# Patient Record
Sex: FEMALE | Race: BLACK OR AFRICAN AMERICAN | NOT HISPANIC OR LATINO | Employment: UNEMPLOYED | URBAN - METROPOLITAN AREA
[De-identification: names, ages, dates, MRNs, and addresses within clinical notes are randomized per-mention and may not be internally consistent; named-entity substitution may affect disease eponyms.]

---

## 2021-02-10 LAB — PAP SMEAR - HIM PATIENT REPORTED: NEGATIVE

## 2021-07-15 ENCOUNTER — HOSPITAL ENCOUNTER (EMERGENCY)
Facility: CLINIC | Age: 31
Discharge: HOME | End: 2021-07-15
Attending: EMERGENCY MEDICINE | Admitting: EMERGENCY MEDICINE
Payer: COMMERCIAL

## 2021-07-15 VITALS
HEIGHT: 67 IN | HEART RATE: 94 BPM | WEIGHT: 145 LBS | SYSTOLIC BLOOD PRESSURE: 132 MMHG | DIASTOLIC BLOOD PRESSURE: 81 MMHG | TEMPERATURE: 98.7 F | RESPIRATION RATE: 18 BRPM | BODY MASS INDEX: 22.76 KG/M2 | OXYGEN SATURATION: 98 %

## 2021-07-15 DIAGNOSIS — M54.50 ACUTE BILATERAL LOW BACK PAIN WITHOUT SCIATICA: ICD-10-CM

## 2021-07-15 DIAGNOSIS — N93.9 VAGINAL BLEEDING: ICD-10-CM

## 2021-07-15 LAB
ALBUMIN UR-MCNC: 20 MG/DL
ANION GAP SERPL CALCULATED.3IONS-SCNC: 3 MMOL/L (ref 3–14)
APPEARANCE UR: ABNORMAL
B-HCG FREE SERPL-ACNC: <5 IU/L (ref 0–5)
BASOPHILS # BLD AUTO: 0.1 10E3/UL (ref 0–0.2)
BASOPHILS NFR BLD AUTO: 1 %
BILIRUB UR QL STRIP: NEGATIVE
BUN SERPL-MCNC: 10 MG/DL (ref 7–30)
CALCIUM SERPL-MCNC: 9.2 MG/DL (ref 8.5–10.1)
CHLORIDE BLD-SCNC: 106 MMOL/L (ref 94–109)
CO2 SERPL-SCNC: 29 MMOL/L (ref 20–32)
COLOR UR AUTO: ABNORMAL
CREAT SERPL-MCNC: 0.98 MG/DL (ref 0.52–1.04)
EOSINOPHIL # BLD AUTO: 0.1 10E3/UL (ref 0–0.7)
EOSINOPHIL NFR BLD AUTO: 1 %
ERYTHROCYTE [DISTWIDTH] IN BLOOD BY AUTOMATED COUNT: 14.6 % (ref 10–15)
GFR SERPL CREATININE-BSD FRML MDRD: 78 ML/MIN/1.73M2
GLUCOSE BLD-MCNC: 91 MG/DL (ref 70–99)
GLUCOSE UR STRIP-MCNC: NEGATIVE MG/DL
HCT VFR BLD AUTO: 39.3 % (ref 35–47)
HGB BLD-MCNC: 12.2 G/DL (ref 11.7–15.7)
HGB UR QL STRIP: ABNORMAL
IMM GRANULOCYTES # BLD: 0 10E3/UL
IMM GRANULOCYTES NFR BLD: 0 %
INR PPP: 1.06 (ref 0.85–1.15)
KETONES UR STRIP-MCNC: NEGATIVE MG/DL
LEUKOCYTE ESTERASE UR QL STRIP: NEGATIVE
LYMPHOCYTES # BLD AUTO: 2 10E3/UL (ref 0.8–5.3)
LYMPHOCYTES NFR BLD AUTO: 31 %
MCH RBC QN AUTO: 28.5 PG (ref 26.5–33)
MCHC RBC AUTO-ENTMCNC: 31 G/DL (ref 31.5–36.5)
MCV RBC AUTO: 92 FL (ref 78–100)
MONOCYTES # BLD AUTO: 0.6 10E3/UL (ref 0–1.3)
MONOCYTES NFR BLD AUTO: 10 %
MUCOUS THREADS #/AREA URNS LPF: PRESENT /LPF
NEUTROPHILS # BLD AUTO: 3.6 10E3/UL (ref 1.6–8.3)
NEUTROPHILS NFR BLD AUTO: 57 %
NITRATE UR QL: NEGATIVE
NRBC # BLD AUTO: 0 10E3/UL
NRBC BLD AUTO-RTO: 0 /100
PH UR STRIP: 7 [PH] (ref 5–7)
PLATELET # BLD AUTO: 211 10E3/UL (ref 150–450)
POTASSIUM BLD-SCNC: 3.4 MMOL/L (ref 3.4–5.3)
RBC # BLD AUTO: 4.28 10E6/UL (ref 3.8–5.2)
RBC URINE: >182 /HPF
SODIUM SERPL-SCNC: 138 MMOL/L (ref 133–144)
SP GR UR STRIP: 1.01 (ref 1–1.03)
UROBILINOGEN UR STRIP-MCNC: NORMAL MG/DL
WBC # BLD AUTO: 6.3 10E3/UL (ref 4–11)
WBC URINE: 1 /HPF

## 2021-07-15 PROCEDURE — 85025 COMPLETE CBC W/AUTO DIFF WBC: CPT | Performed by: EMERGENCY MEDICINE

## 2021-07-15 PROCEDURE — 36415 COLL VENOUS BLD VENIPUNCTURE: CPT | Performed by: EMERGENCY MEDICINE

## 2021-07-15 PROCEDURE — 80048 BASIC METABOLIC PNL TOTAL CA: CPT | Performed by: EMERGENCY MEDICINE

## 2021-07-15 PROCEDURE — 84702 CHORIONIC GONADOTROPIN TEST: CPT

## 2021-07-15 PROCEDURE — 99284 EMERGENCY DEPT VISIT MOD MDM: CPT | Mod: 25

## 2021-07-15 PROCEDURE — 258N000003 HC RX IP 258 OP 636: Performed by: EMERGENCY MEDICINE

## 2021-07-15 PROCEDURE — 36592 COLLECT BLOOD FROM PICC: CPT | Performed by: EMERGENCY MEDICINE

## 2021-07-15 PROCEDURE — 96360 HYDRATION IV INFUSION INIT: CPT

## 2021-07-15 PROCEDURE — 81001 URINALYSIS AUTO W/SCOPE: CPT | Performed by: EMERGENCY MEDICINE

## 2021-07-15 PROCEDURE — 250N000013 HC RX MED GY IP 250 OP 250 PS 637: Performed by: EMERGENCY MEDICINE

## 2021-07-15 PROCEDURE — 85610 PROTHROMBIN TIME: CPT | Performed by: EMERGENCY MEDICINE

## 2021-07-15 RX ORDER — SODIUM CHLORIDE 9 MG/ML
INJECTION, SOLUTION INTRAVENOUS CONTINUOUS
Status: DISCONTINUED | OUTPATIENT
Start: 2021-07-15 | End: 2021-07-16 | Stop reason: HOSPADM

## 2021-07-15 RX ORDER — CYCLOBENZAPRINE HCL 10 MG
5-10 TABLET ORAL 3 TIMES DAILY PRN
Qty: 20 TABLET | Refills: 0 | Status: ON HOLD | OUTPATIENT
Start: 2021-07-15 | End: 2024-03-25

## 2021-07-15 RX ORDER — OXYCODONE HYDROCHLORIDE 5 MG/1
10 TABLET ORAL ONCE
Status: COMPLETED | OUTPATIENT
Start: 2021-07-15 | End: 2021-07-15

## 2021-07-15 RX ADMIN — SODIUM CHLORIDE 1000 ML: 9 INJECTION, SOLUTION INTRAVENOUS at 22:18

## 2021-07-15 RX ADMIN — OXYCODONE HYDROCHLORIDE 10 MG: 5 TABLET ORAL at 23:32

## 2021-07-15 ASSESSMENT — ENCOUNTER SYMPTOMS
BACK PAIN: 1
DIZZINESS: 0
HEADACHES: 1

## 2021-07-15 ASSESSMENT — MIFFLIN-ST. JEOR: SCORE: 1415.35

## 2021-07-16 NOTE — ED PROVIDER NOTES
"  History   Chief Complaint:  Vaginal Bleeding       The history is provided by the patient.      Arnulfo Coker is a 31 year old female who presents for evaluation of vaginal bleeding. The patient states that she was expecting her menstrual period to start two days ago as she has very regular menstrual cycles. She began to experience vaginal bleeding the day before this, which she states is odd. She notes this to be spotting for the first two days before becoming heavier than normal today. She also notes of low back pain which she states is very different compared to her normal abdominal cramping that she experiences. She states this back pain is more intense than her usually menstrual cramping. She has treated her pain with medications every few hours. She also notes of a slight intermittent headache. She denies trauma to her back, dizziness or incontinence or numbness/weakness in the legs. Patient is sexually active. She presents today concerned for a UTI.       Review of Systems   Genitourinary: Positive for vaginal bleeding.   Musculoskeletal: Positive for back pain (low).   Neurological: Positive for headaches. Negative for dizziness.   All other systems reviewed and are negative.    Allergies:  No Known Drug Allergies    Medications:    The patient is not currently taking any prescribed medications.    Past Medical History:    History reviewed. No past medical history listed or noted by the patient.     Social History:  The patient presents to the emergency department alone.      Physical Exam     Patient Vitals for the past 24 hrs:   BP Temp Temp src Pulse Resp SpO2 Height Weight   07/15/21 2152 132/81 98.7  F (37.1  C) Oral 94 18 98 % 1.702 m (5' 7\") 65.8 kg (145 lb)       Physical Exam  VS: Reviewed per above  HENT: normal speech  EYES: sclera anicteric  CV: Rate as noted, regular rhythm.   RESP: Effort normal. Breath sounds are normal bilaterally.  GI: no tenderness/rebound/guarding, not " distended.  NEURO: GCS 15, cranial nerves II through XII are intact, 5 out of 5 strength in all 4 extremities, sensation is intact light touch in all 4 extremities  MSK: No deformity of the extremities  SKIN: Warm and dry      Emergency Department Course     Laboratory:   CBC: WBC: 6.3, HB.2, PLT: 211    BMP: WNL (Creatinine: 0.98)    UA with Microscopic: Color: Light Red, Appearance: Slightly Cloudy, Blood: Large, Protein Albumin: 20, WBC: >182 (H), Mucous: Present, o/w Negative    INR: 1.06    iStat HCG quantitative pregnancy POCT: <5.0    Emergency Department Course:    Reviewed:  I reviewed nursing notes, vitals, past medical history and care everywhere    Assessments:   I obtained history and examined the patient as noted above.    I rechecked the patient and explained findings. At this point I feel that the patient is safe for discharge, and the patient agrees.      Interventions:  2218 NS 1L IV    Disposition:  The patient was discharged to home.     Impression & Plan     Medical Decision Making:  Patient presents to the ER for evaluation of irregular vaginal bleeding and low back pain.  On arrival vital signs are reassuring.  Abdomen is soft and nontender without peritoneal signs.  Hemoglobin is stable.  No evidence of pregnancy.  No back pain red flags.  No evidence of UTI.  There is hematuria but this is likely due to contamination with vaginal bleeding.  No unilateral flank pain to suggest occult renal colic.  Discussed gynecology follow-up.  Patient was not interested in starting OCP.  Return precautions discussed prior to discharge.    Diagnosis:    ICD-10-CM    1. Acute bilateral low back pain without sciatica  M54.5    2. Vaginal bleeding  N93.9        Discharge Medications:  New Prescriptions    CYCLOBENZAPRINE (FLEXERIL) 10 MG TABLET    Take 0.5-1 tablets (5-10 mg) by mouth 3 times daily as needed for other (low back pain)       Scribe Disclosure:  I, Humberto Huitron, am serving as a  scribe at 10:03 PM on 7/15/2021 to document services personally performed by Timothy Maradiaga MD based on my observations and the provider's statements to me.          Timothy Maradiaga MD  07/16/21 0017

## 2021-07-16 NOTE — DISCHARGE INSTRUCTIONS
Discharge Instructions  Vaginal Bleeding    You were seen today for unusual vaginal bleeding. Heavy or irregular bleeding may be caused by many different things such as hormone changes, infection, birth control, or cancer. At this time your provider does not find that your bleeding is a sign of anything dangerous or life-threatening, and you have not lost enough blood to be dangerous.  However, sometimes the signs of serious illness do not show up right away.  If you have new or worse symptoms, you may need to be seen again in the Emergency Department or by your primary provider.      Generally, every Emergency Department visit should have a follow-up clinic visit with either a primary or a specialty clinic/provider. Please follow-up as instructed by your emergency provider today.    Return to the Emergency Department if:  You feel lightheaded or faint.  Your bleeding becomes much heavier than it is now.  You have severe cramping or abdominal (belly) pain.  You have any new or different symptoms.    Treatment:  Motrin  or Advil  (ibuprofen) can help relieve cramps and can also decrease bleeding. You may use this according to the directions on the package. Do not use a medicine that you are allergic to, or if your provider has told you not to use it.  Hormone pills or birth control pills are occasionally prescribed to help control the bleeding but often this is left to an OB/GYN provider. These can cause problems if you have a history of blood clots or stroke, so tell your provider if you have these problems before you leave.  Iron tablets may be recommended if you have anemia (low blood count.) Iron can cause constipation, so be sure to have plenty of fiber in your diet and let your provider know if you have problems.  Drinking plenty of fluid is important. Be sure to drink extra water or other healthy drinks.  If you were given a prescription for medicine here today, be sure to read all of the information  (including the package insert) that comes with your prescription.  This will include important information about the medicine, its side effects, and any warnings that you need to know about.  The pharmacist who fills the prescription can provide more information and answer questions you may have about the medicine.  If you have questions or concerns that the pharmacist cannot address, please call or return to the Emergency Department.     Remember that you can always come back to the Emergency Department if you are not able to see your regular provider in the amount of time listed above, if you get any new symptoms, or if there is anything that worries you.    Discharge Instructions  Back Pain  You were seen today for back pain. Back pain can have many causes, but most will get better without surgery or other specific treatment. Sometimes there is a herniated ( slipped ) disc. We do not usually do MRI scans to look for these right away, since most herniated discs will get better on their own with time.  Today, we did not find any evidence that your back pain was caused by a serious condition. However, sometimes symptoms develop over time and cannot be found during an emergency visit, so it is very important that you follow up with your primary provider.  Generally, every Emergency Department visit should have a follow-up clinic visit with either a primary or a specialty clinic/provider. Please follow-up as instructed by your emergency provider today.    Return to the Emergency Department if:  You develop a fever with your back pain.   You have weakness or change in sensation in one or both legs.  You lose control of your bowels or bladder, or cannot empty your bladder (cannot pee).  Your pain gets much worse.     Follow-up with your provider:  Unless your pain has completely gone away, please make an appointment with your provider within one week. Most of the routine care for back pain is available in a clinic and  not the Emergency Department. You may need further management of your back pain, such as more pain medication, imaging such as an X-ray or MRI, or physical therapy.    What can I do to help myself?  Remain Active -- People are often afraid that they will hurt their back further or delay recovery by remaining active, but this is one of the best things you can do for your back. In fact, staying in bed for a long time to rest is not recommended. Studies have shown that people with low back pain recover faster when they remain active. Movement helps to bring blood flow to the muscles and relieve muscle spasms as well as preventing loss of muscle strength.  Heat -- Using a heating pad can help with low back pain during the first few weeks. Do not sleep with a heating pad, as you can be burned.   Pain medications - You may take a pain medication such as Tylenol  (acetaminophen), Advil , Motrin  (ibuprofen) or Aleve  (naproxen).  If you were given a prescription for medicine here today, be sure to read all of the information (including the package insert) that comes with your prescription.  This will include important information about the medicine, its side effects, and any warnings that you need to know about.  The pharmacist who fills the prescription can provide more information and answer questions you may have about the medicine.  If you have questions or concerns that the pharmacist cannot address, please call or return to the Emergency Department.   Remember that you can always come back to the Emergency Department if you are not able to see your regular provider in the amount of time listed above, if you get any new symptoms, or if there is anything that worries you.

## 2022-04-06 ENCOUNTER — PRENATAL OFFICE VISIT (OUTPATIENT)
Dept: NURSING | Facility: CLINIC | Age: 32
End: 2022-04-06
Payer: COMMERCIAL

## 2022-04-06 DIAGNOSIS — Z91.199 NO-SHOW FOR APPOINTMENT: Primary | ICD-10-CM

## 2022-05-03 ENCOUNTER — ANESTHESIA (OUTPATIENT)
Dept: SURGERY | Facility: CLINIC | Age: 32
End: 2022-05-03
Payer: COMMERCIAL

## 2022-05-03 ENCOUNTER — ANESTHESIA EVENT (OUTPATIENT)
Dept: SURGERY | Facility: CLINIC | Age: 32
End: 2022-05-03
Payer: COMMERCIAL

## 2022-05-03 ENCOUNTER — APPOINTMENT (OUTPATIENT)
Dept: ULTRASOUND IMAGING | Facility: CLINIC | Age: 32
End: 2022-05-03
Attending: EMERGENCY MEDICINE
Payer: COMMERCIAL

## 2022-05-03 ENCOUNTER — APPOINTMENT (OUTPATIENT)
Dept: ULTRASOUND IMAGING | Facility: CLINIC | Age: 32
End: 2022-05-03
Attending: OBSTETRICS & GYNECOLOGY
Payer: COMMERCIAL

## 2022-05-03 ENCOUNTER — HOSPITAL ENCOUNTER (OUTPATIENT)
Facility: CLINIC | Age: 32
Discharge: HOME | End: 2022-05-03
Attending: EMERGENCY MEDICINE | Admitting: OBSTETRICS & GYNECOLOGY
Payer: COMMERCIAL

## 2022-05-03 VITALS
DIASTOLIC BLOOD PRESSURE: 55 MMHG | HEART RATE: 85 BPM | OXYGEN SATURATION: 100 % | RESPIRATION RATE: 12 BRPM | TEMPERATURE: 98.6 F | SYSTOLIC BLOOD PRESSURE: 96 MMHG

## 2022-05-03 DIAGNOSIS — O03.4 RETAINED PRODUCTS OF CONCEPTION AFTER MISCARRIAGE: ICD-10-CM

## 2022-05-03 DIAGNOSIS — O03.4 INCOMPLETE ABORTION: Primary | ICD-10-CM

## 2022-05-03 DIAGNOSIS — D64.9 ANEMIA, UNSPECIFIED TYPE: ICD-10-CM

## 2022-05-03 LAB
ABO/RH(D): NORMAL
B-HCG SERPL-ACNC: 2011 IU/L (ref 0–5)
BASOPHILS # BLD AUTO: 0.1 10E3/UL (ref 0–0.2)
BASOPHILS NFR BLD AUTO: 1 %
EOSINOPHIL # BLD AUTO: 0.1 10E3/UL (ref 0–0.7)
EOSINOPHIL NFR BLD AUTO: 2 %
ERYTHROCYTE [DISTWIDTH] IN BLOOD BY AUTOMATED COUNT: 15 % (ref 10–15)
HCT VFR BLD AUTO: 29 % (ref 35–47)
HCT VFR BLD AUTO: 32 % (ref 35–47)
HGB BLD-MCNC: 8.7 G/DL (ref 11.7–15.7)
HGB BLD-MCNC: 9.8 G/DL (ref 11.7–15.7)
IMM GRANULOCYTES # BLD: 0 10E3/UL
IMM GRANULOCYTES NFR BLD: 0 %
LYMPHOCYTES # BLD AUTO: 1.6 10E3/UL (ref 0.8–5.3)
LYMPHOCYTES NFR BLD AUTO: 28 %
MCH RBC QN AUTO: 27.3 PG (ref 26.5–33)
MCHC RBC AUTO-ENTMCNC: 30.6 G/DL (ref 31.5–36.5)
MCV RBC AUTO: 89 FL (ref 78–100)
MONOCYTES # BLD AUTO: 0.4 10E3/UL (ref 0–1.3)
MONOCYTES NFR BLD AUTO: 6 %
NEUTROPHILS # BLD AUTO: 3.6 10E3/UL (ref 1.6–8.3)
NEUTROPHILS NFR BLD AUTO: 63 %
NRBC # BLD AUTO: 0 10E3/UL
NRBC BLD AUTO-RTO: 0 /100
PLAT MORPH BLD: ABNORMAL
PLATELET # BLD AUTO: ABNORMAL 10*3/UL
RBC # BLD AUTO: 3.59 10E6/UL (ref 3.8–5.2)
RBC MORPH BLD: ABNORMAL
SARS-COV-2 RNA RESP QL NAA+PROBE: NEGATIVE
SPECIMEN EXPIRATION DATE: NORMAL
WBC # BLD AUTO: 5.7 10E3/UL (ref 4–11)

## 2022-05-03 PROCEDURE — 85048 AUTOMATED LEUKOCYTE COUNT: CPT | Performed by: EMERGENCY MEDICINE

## 2022-05-03 PROCEDURE — P9041 ALBUMIN (HUMAN),5%, 50ML: HCPCS | Performed by: NURSE ANESTHETIST, CERTIFIED REGISTERED

## 2022-05-03 PROCEDURE — U0005 INFEC AGEN DETEC AMPLI PROBE: HCPCS | Performed by: EMERGENCY MEDICINE

## 2022-05-03 PROCEDURE — 999N000063 US INTRAOPERATIVE: Mod: TC

## 2022-05-03 PROCEDURE — 85014 HEMATOCRIT: CPT | Performed by: EMERGENCY MEDICINE

## 2022-05-03 PROCEDURE — 250N000011 HC RX IP 250 OP 636: Performed by: ANESTHESIOLOGY

## 2022-05-03 PROCEDURE — 710N000012 HC RECOVERY PHASE 2, PER MINUTE: Performed by: OBSTETRICS & GYNECOLOGY

## 2022-05-03 PROCEDURE — 999N000141 HC STATISTIC PRE-PROCEDURE NURSING ASSESSMENT: Performed by: OBSTETRICS & GYNECOLOGY

## 2022-05-03 PROCEDURE — 99285 EMERGENCY DEPT VISIT HI MDM: CPT | Mod: 25

## 2022-05-03 PROCEDURE — 250N000009 HC RX 250: Performed by: NURSE ANESTHETIST, CERTIFIED REGISTERED

## 2022-05-03 PROCEDURE — 76801 OB US < 14 WKS SINGLE FETUS: CPT

## 2022-05-03 PROCEDURE — 710N000009 HC RECOVERY PHASE 1, LEVEL 1, PER MIN: Performed by: OBSTETRICS & GYNECOLOGY

## 2022-05-03 PROCEDURE — 360N000075 HC SURGERY LEVEL 2, PER MIN: Performed by: OBSTETRICS & GYNECOLOGY

## 2022-05-03 PROCEDURE — 258N000003 HC RX IP 258 OP 636: Performed by: EMERGENCY MEDICINE

## 2022-05-03 PROCEDURE — 84702 CHORIONIC GONADOTROPIN TEST: CPT | Performed by: EMERGENCY MEDICINE

## 2022-05-03 PROCEDURE — 272N000001 HC OR GENERAL SUPPLY STERILE: Performed by: OBSTETRICS & GYNECOLOGY

## 2022-05-03 PROCEDURE — 96361 HYDRATE IV INFUSION ADD-ON: CPT

## 2022-05-03 PROCEDURE — 96374 THER/PROPH/DIAG INJ IV PUSH: CPT

## 2022-05-03 PROCEDURE — 88233 TISSUE CULTURE SKIN/BIOPSY: CPT | Performed by: OBSTETRICS & GYNECOLOGY

## 2022-05-03 PROCEDURE — 370N000017 HC ANESTHESIA TECHNICAL FEE, PER MIN: Performed by: OBSTETRICS & GYNECOLOGY

## 2022-05-03 PROCEDURE — 88305 TISSUE EXAM BY PATHOLOGIST: CPT | Mod: TC | Performed by: OBSTETRICS & GYNECOLOGY

## 2022-05-03 PROCEDURE — C9803 HOPD COVID-19 SPEC COLLECT: HCPCS

## 2022-05-03 PROCEDURE — 86901 BLOOD TYPING SEROLOGIC RH(D): CPT | Performed by: EMERGENCY MEDICINE

## 2022-05-03 PROCEDURE — 250N000011 HC RX IP 250 OP 636: Performed by: NURSE ANESTHETIST, CERTIFIED REGISTERED

## 2022-05-03 PROCEDURE — 36415 COLL VENOUS BLD VENIPUNCTURE: CPT | Performed by: EMERGENCY MEDICINE

## 2022-05-03 PROCEDURE — 250N000011 HC RX IP 250 OP 636: Performed by: EMERGENCY MEDICINE

## 2022-05-03 PROCEDURE — 258N000003 HC RX IP 258 OP 636: Performed by: NURSE ANESTHETIST, CERTIFIED REGISTERED

## 2022-05-03 PROCEDURE — 88291 CYTO/MOLECULAR REPORT: CPT | Performed by: MEDICAL GENETICS

## 2022-05-03 RX ORDER — PHENYLEPHRINE HYDROCHLORIDE 10 MG/ML
INJECTION INTRAVENOUS PRN
Status: DISCONTINUED | OUTPATIENT
Start: 2022-05-03 | End: 2022-05-03

## 2022-05-03 RX ORDER — SODIUM CHLORIDE, SODIUM LACTATE, POTASSIUM CHLORIDE, CALCIUM CHLORIDE 600; 310; 30; 20 MG/100ML; MG/100ML; MG/100ML; MG/100ML
INJECTION, SOLUTION INTRAVENOUS CONTINUOUS PRN
Status: DISCONTINUED | OUTPATIENT
Start: 2022-05-03 | End: 2022-05-03

## 2022-05-03 RX ORDER — ONDANSETRON 4 MG/1
4 TABLET, ORALLY DISINTEGRATING ORAL EVERY 30 MIN PRN
Status: DISCONTINUED | OUTPATIENT
Start: 2022-05-03 | End: 2022-05-03 | Stop reason: HOSPADM

## 2022-05-03 RX ORDER — FENTANYL CITRATE 50 UG/ML
50 INJECTION, SOLUTION INTRAMUSCULAR; INTRAVENOUS ONCE
Status: COMPLETED | OUTPATIENT
Start: 2022-05-03 | End: 2022-05-03

## 2022-05-03 RX ORDER — ONDANSETRON 2 MG/ML
INJECTION INTRAMUSCULAR; INTRAVENOUS PRN
Status: DISCONTINUED | OUTPATIENT
Start: 2022-05-03 | End: 2022-05-03

## 2022-05-03 RX ORDER — DOXYCYCLINE 100 MG/1
100 CAPSULE ORAL ONCE
Status: CANCELLED | OUTPATIENT
Start: 2022-05-03 | End: 2022-05-03

## 2022-05-03 RX ORDER — OXYCODONE HYDROCHLORIDE 5 MG/1
5 TABLET ORAL EVERY 4 HOURS PRN
Status: DISCONTINUED | OUTPATIENT
Start: 2022-05-03 | End: 2022-05-03 | Stop reason: HOSPADM

## 2022-05-03 RX ORDER — LIDOCAINE HYDROCHLORIDE 10 MG/ML
INJECTION, SOLUTION INFILTRATION; PERINEURAL PRN
Status: DISCONTINUED | OUTPATIENT
Start: 2022-05-03 | End: 2022-05-03

## 2022-05-03 RX ORDER — SODIUM CHLORIDE 9 MG/ML
INJECTION, SOLUTION INTRAVENOUS CONTINUOUS
Status: DISCONTINUED | OUTPATIENT
Start: 2022-05-03 | End: 2022-05-03 | Stop reason: HOSPADM

## 2022-05-03 RX ORDER — LABETALOL HYDROCHLORIDE 5 MG/ML
10 INJECTION, SOLUTION INTRAVENOUS
Status: DISCONTINUED | OUTPATIENT
Start: 2022-05-03 | End: 2022-05-03 | Stop reason: HOSPADM

## 2022-05-03 RX ORDER — FENTANYL CITRATE 50 UG/ML
INJECTION, SOLUTION INTRAMUSCULAR; INTRAVENOUS PRN
Status: DISCONTINUED | OUTPATIENT
Start: 2022-05-03 | End: 2022-05-03

## 2022-05-03 RX ORDER — IBUPROFEN 800 MG/1
800 TABLET, FILM COATED ORAL EVERY 6 HOURS PRN
Qty: 30 TABLET | Refills: 0 | Status: ON HOLD | OUTPATIENT
Start: 2022-05-03 | End: 2024-03-25

## 2022-05-03 RX ORDER — ONDANSETRON 2 MG/ML
4 INJECTION INTRAMUSCULAR; INTRAVENOUS EVERY 30 MIN PRN
Status: DISCONTINUED | OUTPATIENT
Start: 2022-05-03 | End: 2022-05-03 | Stop reason: HOSPADM

## 2022-05-03 RX ORDER — ACETAMINOPHEN 325 MG/1
975 TABLET ORAL ONCE
Status: CANCELLED | OUTPATIENT
Start: 2022-05-03 | End: 2022-05-03

## 2022-05-03 RX ORDER — SODIUM CHLORIDE, SODIUM LACTATE, POTASSIUM CHLORIDE, CALCIUM CHLORIDE 600; 310; 30; 20 MG/100ML; MG/100ML; MG/100ML; MG/100ML
INJECTION, SOLUTION INTRAVENOUS CONTINUOUS
Status: DISCONTINUED | OUTPATIENT
Start: 2022-05-03 | End: 2022-05-03 | Stop reason: HOSPADM

## 2022-05-03 RX ORDER — IBUPROFEN 800 MG/1
800 TABLET, FILM COATED ORAL ONCE
Status: DISCONTINUED | OUTPATIENT
Start: 2022-05-03 | End: 2022-05-03 | Stop reason: HOSPADM

## 2022-05-03 RX ORDER — DEXAMETHASONE SODIUM PHOSPHATE 4 MG/ML
INJECTION, SOLUTION INTRA-ARTICULAR; INTRALESIONAL; INTRAMUSCULAR; INTRAVENOUS; SOFT TISSUE PRN
Status: DISCONTINUED | OUTPATIENT
Start: 2022-05-03 | End: 2022-05-03

## 2022-05-03 RX ORDER — HYDROMORPHONE HCL IN WATER/PF 6 MG/30 ML
0.2 PATIENT CONTROLLED ANALGESIA SYRINGE INTRAVENOUS EVERY 5 MIN PRN
Status: DISCONTINUED | OUTPATIENT
Start: 2022-05-03 | End: 2022-05-03 | Stop reason: HOSPADM

## 2022-05-03 RX ORDER — ALBUMIN, HUMAN INJ 5% 5 %
SOLUTION INTRAVENOUS CONTINUOUS PRN
Status: DISCONTINUED | OUTPATIENT
Start: 2022-05-03 | End: 2022-05-03

## 2022-05-03 RX ORDER — FENTANYL CITRATE 50 UG/ML
25 INJECTION, SOLUTION INTRAMUSCULAR; INTRAVENOUS EVERY 5 MIN PRN
Status: DISCONTINUED | OUTPATIENT
Start: 2022-05-03 | End: 2022-05-03 | Stop reason: HOSPADM

## 2022-05-03 RX ORDER — PROPOFOL 10 MG/ML
INJECTION, EMULSION INTRAVENOUS PRN
Status: DISCONTINUED | OUTPATIENT
Start: 2022-05-03 | End: 2022-05-03

## 2022-05-03 RX ORDER — ACETAMINOPHEN 325 MG/1
975 TABLET ORAL ONCE
Status: DISCONTINUED | OUTPATIENT
Start: 2022-05-03 | End: 2022-05-03 | Stop reason: HOSPADM

## 2022-05-03 RX ADMIN — ALBUMIN HUMAN: 0.05 INJECTION, SOLUTION INTRAVENOUS at 16:51

## 2022-05-03 RX ADMIN — LIDOCAINE HYDROCHLORIDE 50 MG: 10 INJECTION, SOLUTION INFILTRATION; PERINEURAL at 16:39

## 2022-05-03 RX ADMIN — PHENYLEPHRINE HYDROCHLORIDE 100 MCG: 10 INJECTION INTRAVENOUS at 16:49

## 2022-05-03 RX ADMIN — FENTANYL CITRATE 25 MCG: 50 INJECTION, SOLUTION INTRAMUSCULAR; INTRAVENOUS at 16:58

## 2022-05-03 RX ADMIN — ONDANSETRON HYDROCHLORIDE 4 MG: 2 INJECTION, SOLUTION INTRAVENOUS at 16:50

## 2022-05-03 RX ADMIN — SODIUM CHLORIDE 1000 ML: 9 INJECTION, SOLUTION INTRAVENOUS at 12:57

## 2022-05-03 RX ADMIN — FENTANYL CITRATE 25 MCG: 50 INJECTION, SOLUTION INTRAMUSCULAR; INTRAVENOUS at 17:39

## 2022-05-03 RX ADMIN — SODIUM CHLORIDE 1000 ML: 9 INJECTION, SOLUTION INTRAVENOUS at 15:00

## 2022-05-03 RX ADMIN — Medication 60 MG: at 16:39

## 2022-05-03 RX ADMIN — PROPOFOL 120 MG: 10 INJECTION, EMULSION INTRAVENOUS at 16:39

## 2022-05-03 RX ADMIN — FENTANYL CITRATE 50 MCG: 50 INJECTION INTRAMUSCULAR; INTRAVENOUS at 16:22

## 2022-05-03 RX ADMIN — SODIUM CHLORIDE, POTASSIUM CHLORIDE, SODIUM LACTATE AND CALCIUM CHLORIDE: 600; 310; 30; 20 INJECTION, SOLUTION INTRAVENOUS at 16:36

## 2022-05-03 RX ADMIN — DEXAMETHASONE SODIUM PHOSPHATE 8 MG: 4 INJECTION, SOLUTION INTRA-ARTICULAR; INTRALESIONAL; INTRAMUSCULAR; INTRAVENOUS; SOFT TISSUE at 16:39

## 2022-05-03 ASSESSMENT — ACTIVITIES OF DAILY LIVING (ADL)
ADLS_ACUITY_SCORE: 12
ADLS_ACUITY_SCORE: 12

## 2022-05-03 NOTE — ED NOTES
Writer assisted pt in changing chux pad saturated with blood. Writer reiterated plan of care to pt. Pt verbalized understanding.

## 2022-05-03 NOTE — ED PROVIDER NOTES
History   Chief Complaint:  Miscarriage     The history is provided by the patient.      Arnulfo Coker is a 32 year old female who presents with vaginal bleeding. Patient presents from [facility] for vaginal bleeding and miscarriage. She is approximately 3 months pregnant and this is her first pregnancy. Patient developed light bleeding on 4/29. This morning she developed heavy bleeding. Denies injury or trauma. She has had two ultrasounds this pregnancy. One a few months ago at [facility] showed no gestational sac. She had another done yesterday with results below. She was taking Tylenol and ibuprofen for pain. She started an antibiotic yesterday for a UTI. History of uterine fibroid and was going to have surgery but it was cancelled due to pregnancy. She does not know her blood type. No history of cardiac or bleeding problems. Her last menstrual period was 2/9/22.     Ultrasound from [facility] 5/2/22  IMPRESSION:   1. No definite intrauterine pregnancy identified.  2. 2 areas of fluid within the endometrial space as described above, nonspecific.      Review of Systems   Genitourinary: Positive for pelvic pain and vaginal bleeding.   All other systems reviewed and are negative.    Allergies:  The patient has no known allergies.     Medications:  Amoxicillin   Ferrous sulfate   Cholecalciferol   Remeron   Protonix     Past Medical History:     H. Pylori   Anxiety  GERD  Vitamin d deficiency   Depression, single episode       Past Surgical History:    EGD     Family History:    Father: hypertension  Mother: DANTE disease     Social History:  Presents to ED alone     Physical Exam     Patient Vitals for the past 24 hrs:   BP Temp Temp src Pulse Resp SpO2   05/03/22 1600 100/66 -- -- 96 -- 100 %   05/03/22 1545 96/56 -- -- 80 -- 99 %   05/03/22 1500 103/56 -- -- 99 -- 99 %   05/03/22 1457 -- -- -- -- -- 99 %   05/03/22 1455 103/77 -- -- 88 -- 92 %   05/03/22 1448 (!) 81/46 -- -- 94 -- 100 %   05/03/22 1445  (!) 62/42 -- -- 106 -- 99 %   05/03/22 1420 111/86 -- -- -- -- 100 %   05/03/22 1400 109/75 -- -- 80 -- 100 %   05/03/22 1350 114/68 -- -- -- -- --   05/03/22 1330 110/74 -- -- 86 -- 100 %   05/03/22 1320 95/68 -- -- 72 -- 100 %   05/03/22 1310 92/72 -- -- -- -- 100 %   05/03/22 1300 96/65 -- -- (!) 123 -- --   05/03/22 1250 117/83 -- -- -- -- --   05/03/22 1240 112/77 -- -- 107 -- --   05/03/22 1223 95/68 98.1  F (36.7  C) Temporal (!) 121 20 100 %   05/03/22 1222 95/68 97.6  F (36.4  C) Temporal 111 20 100 %       Physical Exam  Constitutional:       General: She is not in acute distress.     Appearance: She is not diaphoretic.   HENT:      Head: Atraumatic.      Mouth/Throat:      Mouth: Mucous membranes are moist.      Pharynx: No oropharyngeal exudate.   Eyes:      General: No scleral icterus.     Pupils: Pupils are equal, round, and reactive to light.   Cardiovascular:      Rate and Rhythm: Normal rate and regular rhythm.      Heart sounds: Normal heart sounds.   Pulmonary:      Effort: No respiratory distress.      Breath sounds: Normal breath sounds.   Abdominal:      General: Bowel sounds are normal.      Palpations: Abdomen is soft.      Tenderness: There is abdominal tenderness in the suprapubic area.   Genitourinary:     Comments: No external vaginal lesions.  There is a narrow introitus.  There is a large amount of clot in the vaginal vault which was removed.  The cervix is visualized and is open.  There is ongoing oozing from the cervical os.  No genital tract lacerations.  No cervical motion tenderness.  No erythema or discharge from the os.  Musculoskeletal:         General: No tenderness.   Skin:     General: Skin is warm.      Capillary Refill: Capillary refill takes less than 2 seconds.      Findings: No rash.   Neurological:      General: No focal deficit present.      Mental Status: She is oriented to person, place, and time.   Psychiatric:         Mood and Affect: Mood normal.         Behavior:  Behavior normal.       Emergency Department Course     Imaging:  US OB < 14 Weeks Single   Final Result   IMPRESSION: No convincing evidence of intrauterine products of   conception. Differential includes spontaneous  and normal   pregnancy with incorrect dates. Ectopic pregnancy is not excluded.   Followup as clinically indicated. No evidence for hemorrhage.      THOMAS DIALLO MD            SYSTEM ID:  [id number]      Report per radiology    Laboratory:  Labs Ordered and Resulted from Time of ED Arrival to Time of ED Departure   CBC WITH PLATELETS AND DIFFERENTIAL - Abnormal       Result Value    WBC Count 5.7      RBC Count 3.59 (*)     Hemoglobin 9.8 (*)     Hematocrit 32.0 (*)     MCV 89      MCH 27.3      MCHC 30.6 (*)     RDW 15.0      Platelet Count        % Neutrophils 63      % Lymphocytes 28      % Monocytes 6      % Eosinophils 2      % Basophils 1      % Immature Granulocytes 0      NRBCs per 100 WBC 0      Absolute Neutrophils 3.6      Absolute Lymphocytes 1.6      Absolute Monocytes 0.4      Absolute Eosinophils 0.1      Absolute Basophils 0.1      Absolute Immature Granulocytes 0.0      Absolute NRBCs 0.0     RBC AND PLATELET MORPHOLOGY - Abnormal    Platelet Assessment Platelets Clumped (*)     RBC Morphology Confirmed RBC Indices     HEMOGLOBIN AND HEMATOCRIT - Abnormal    Hemoglobin 8.7 (*)     Hematocrit 29.0 (*)    COVID-19 VIRUS (CORONAVIRUS) BY PCR - Normal    SARS CoV2 PCR Negative     HCG QUANTITATIVE PREGNANCY   ABO AND RH    ABO/RH(D) O POS      SPECIMEN EXPIRATION DATE [id number]     WET PREPARATION   CHLAMYDIA TRACHOMATIS PCR   NEISSERIA GONORRHOEAE PCR      Emergency Department Course:     Reviewed:  I reviewed nursing notes, vitals, past medical history and Care Everywhere    Assessments:  1238 I obtained history and examined the patient as noted above.   1424 Performed pelvic exam with female chaperone present.     Consults: Dr Castle     Interventions:  1257 NS 1L IV      Disposition:  The patient was admitted to the hospital under the care of Dr. Castle.     Impression & Plan       Medical Decision Making:  This patient is a 32-year-old woman who presents to the emergency department with a spontaneous .  She is approximately 11 weeks pregnant.  Ultrasound yesterday did not demonstrate a viable pregnancy.  She began to have increased bleeding overnight with passage of clots.  She was lightheaded on arrival with a systolic blood pressure in the 90s.  This improved with IV fluids.  She had mild suprapubic tenderness.  Ultrasound read by radiology without evidence for significant retained products of conception.  However, on pelvic exam the patient had a large amount of clot in vaginal vault which was removed.  She has ongoing bleeding from the cervical os.  When getting back up into the cart after the exam she became lightheaded and her blood pressure dropped again.  This again improved with IV fluids.  Dr. Castle has come evaluated the patient in the ED and she will be taken for D&C.  Blood type is O+.    Critical Care time was 35 minutes for this patient excluding procedures.    Diagnosis:    ICD-10-CM    1. Incomplete   O03.4    2. Anemia, unspecified type  D64.9        Discharge Medications:  New Prescriptions    No medications on file       Scribe Disclosure:  SAMANTHA, Abdulaziz Herrera, am serving as a scribe at 12:37 PM on 5/3/2022 to document services personally performed by Santos Tay MD based on my observations and the provider's statements to me.            Sanots Tay MD  [phone]

## 2022-05-03 NOTE — ANESTHESIA PREPROCEDURE EVALUATION
Anesthesia Pre-Procedure Evaluation    Patient: Arnulfo Coker   MRN: [id number] : 1990        Procedure : Procedure(s):  DILATION AND CURETTAGE, UTERUS, USING SUCTION, WITH ULTRASOUND GUIDANCE          No past medical history on file.   No past surgical history on file.   No Known Allergies   Social History     Tobacco Use     Smoking status: Not on file     Smokeless tobacco: Not on file   Substance Use Topics     Alcohol use: Not on file      Wt Readings from Last 1 Encounters:   07/15/21 65.8 kg (145 lb)        Anesthesia Evaluation            ROS/MED HX  ENT/Pulmonary:       Neurologic:       Cardiovascular:       METS/Exercise Tolerance:     Hematologic:       Musculoskeletal:       GI/Hepatic:       Renal/Genitourinary:       Endo:       Psychiatric/Substance Use:       Infectious Disease:       Malignancy:       Other:   12 weeks gestation, partial miscarriage with active vaginal bleeding            OUTSIDE LABS:  CBC:   Lab Results   Component Value Date    WBC 5.7 2022    WBC 6.3 07/15/2021    HGB 8.7 (L) 2022    HGB 9.8 (L) 2022    HCT 29.0 (L) 2022    HCT 32.0 (L) 2022    PLT  2022      Comment:      Platelets Clumped-Platelet Count Not Available     07/15/2021     BMP:   Lab Results   Component Value Date     07/15/2021     2011    POTASSIUM 3.4 07/15/2021    POTASSIUM 4.0 2011    CHLORIDE 106 07/15/2021    CHLORIDE 105 2011    CO2 29 07/15/2021    CO2 25 2011    BUN 10 07/15/2021    BUN 10 2011    CR 0.98 07/15/2021    CR 0.70 2011    GLC 91 07/15/2021     (H) 2011     COAGS:   Lab Results   Component Value Date    INR 1.06 07/15/2021     POC:   Lab Results   Component Value Date    HCG Negative 2011     HEPATIC: No results found for: ALBUMIN, PROTTOTAL, ALT, AST, GGT, ALKPHOS, BILITOTAL, BILIDIRECT, MICHAEL  OTHER:   Lab Results   Component Value Date    ASIYA 9.2 07/15/2021        Anesthesia Plan    ASA Status:  2, emergent       Anesthesia Type: General.     - Airway: ETT   Induction: Intravenous, RSI.           Consents    Anesthesia Plan(s) and associated risks, benefits, and realistic alternatives discussed. Questions answered and patient/representative(s) expressed understanding.    - Discussed:     - Discussed with:  Patient         Postoperative Care    Pain management: IV analgesics, Oral pain medications, Multi-modal analgesia.   PONV prophylaxis: Ondansetron (or other 5HT-3), Dexamethasone or Solumedrol     Comments:                Riri Kirk MD

## 2022-05-03 NOTE — LETTER
[facility] POST ANESTHESIA CARE  [address] [facility] [address]  [phone]         Medication Pick-Up Form      May 3, 2022    Patient's Name:  Arnulfo Coker    YOB: 1990      I have prescribed the following medication for this patient and request that it be picked up by her family member while she is in postoperative recovery.     Motrin 800mg  1 tablet by mouth every 6 hours, as needed for pain  Dispense: #30    Provider:    Skye Suárez MD on 5/3/2022 at 5:39 PM

## 2022-05-03 NOTE — OP NOTE
Surgery Date:  5/3/2022    Primary Surgeon:    Skye Suárez MD    Assistants:   none    Post-op Diagnosis:    32 year old   with anembryonic gestation at 12wk by LMP, hemorrhage in ED    Procedure:    Cervical dilation and suction curettage.    EBL:  500 mL in ED, 20mL intraoperatively    Anesthesia: GET    UOP: 1000mL    Specimens:  Products of conception    Complications / Findings:  1.  No apparent complications.  2.  anteverted uterus.  3.  No signs of perforation and ultrasound guidance during the procedure confirmed no retained products    Indications: Arnulfo Coker is a 32 year old yo G1 who was unfortunately diagnosed with a missed .  We discussed management options and she chose to proceed with D&C.  Risks of infection, bleeding, uterine perforation were discussed and informed consent was obtained.  She is Rh positive. She presented to the ED in distress, with significant bleeding, and decreasing blood pressures.     Procedure:  She was taken to the OR with IV running and GET anesthesia was administered.  She was prepped and draped and a 'time out' was held to confirm the procedure.  Exam under anesthesia was performed with the above findings.  A speculum was inserted and the anterior lip of the cervix was then grasped with a single tooth tenaculum.  The cervix was already dilated.  An 11mm rigid curette was used after testing for appropriate suction to empty the uterus of products of conception which were grossly seen through the tubing.  Ultrasound was present, and confirmed complete uterine evacuation. A 4.4 cm intramural fibroid was noted to the patient's left, not displacing the endometrium.  All instruments were removed from her vagina and the case was complete.  No apparent complications and she was transferred to the PACU in stable condition.    Skye Suárez MD on 5/3/2022 at 5:26 PM

## 2022-05-03 NOTE — DISCHARGE INSTRUCTIONS
Maximum Acetaminophen/Tylenol in 24 hours= 4,000 mg.  Maximum Ibuprofen/Motrin in 24 hours  = 3,000 mg.    GENERAL ANESTHESIA OR SEDATION ADULT DISCHARGE INSTRUCTIONS   SPECIAL PRECAUTIONS FOR 24 HOURS AFTER SURGERY    IT IS NOT UNUSUAL TO FEEL LIGHT-HEADED OR FAINT, UP TO 24 HOURS AFTER SURGERY OR WHILE TAKING PAIN MEDICATION.  IF YOU HAVE THESE SYMPTOMS; SIT FOR A FEW MINUTES BEFORE STANDING AND HAVE SOMEONE ASSIST YOU WHEN YOU GET UP TO WALK OR USE THE BATHROOM.    YOU SHOULD REST AND RELAX FOR THE NEXT 24 HOURS AND YOU MUST MAKE ARRANGEMENTS TO HAVE SOMEONE STAY WITH YOU FOR AT LEAST 24 HOURS AFTER YOUR DISCHARGE.  AVOID HAZARDOUS AND STRENUOUS ACTIVITIES.  DO NOT MAKE IMPORTANT DECISIONS FOR 24 HOURS.    DO NOT DRIVE ANY VEHICLE OR OPERATE MECHANICAL EQUIPMENT FOR 24 HOURS FOLLOWING THE END OF YOUR SURGERY.  EVEN THOUGH YOU MAY FEEL NORMAL, YOUR REACTIONS MAY BE AFFECTED BY THE MEDICATION YOU HAVE RECEIVED.    DO NOT DRINK ALCOHOLIC BEVERAGES FOR 24 HOURS FOLLOWING YOUR SURGERY.    DRINK CLEAR LIQUIDS (APPLE JUICE, GINGER ALE, 7-UP, BROTH, ETC.).  PROGRESS TO YOUR REGULAR DIET AS YOU FEEL ABLE.    YOU MAY HAVE A DRY MOUTH, A SORE THROAT, MUSCLES ACHES OR TROUBLE SLEEPING.  THESE SHOULD GO AWAY AFTER 24 HOURS.    CALL YOUR DOCTOR FOR ANY OF THE FOLLOWING:  SIGNS OF INFECTION (FEVER, GROWING TENDERNESS AT THE SURGERY SITE, A LARGE AMOUNT OF DRAINAGE OR BLEEDING, SEVERE PAIN, FOUL-SMELLING DRAINAGE, REDNESS OR SWELLING.    IT HAS BEEN OVER 8 TO 10 HOURS SINCE SURGERY AND YOU ARE STILL NOT ABLE TO URINATE (PASS WATER).      DILATION AND CURETTAGE DISCHARGE INSTRUCTIONS  (FOR PATIENTS WHOSE PREGNANCY HAS ENDED)    FOR THE FIRST 1 TO 2 DAYS    -You may feel drowsy and weak.  -Don't drive a car, drink alcohol or use machinery for 24 hours.  -Get plenty of rest.  -You may bathe or shower and climb stairs.  You can go back to other activities after your pain goes away.    -Eat light meals and drink plenty of liquids for  "the first 24 hours.  -If you feel sick to your stomach, stay in bed.  Drink small amounts of clear liquids like 7-Up, tea or soup.    FEVER AND DISCOMFORT  -You may have a low fever (less than 100.4F or 38C or belly cramps.  -Take acetaminophen (Tylenol), ibuprofen (Advil, Motrin) or aspirin as directed for pain.  Or, your doctor may give you pain medicine.  -A heating pad set to \"low\" or a hot water bottle can help with cramps and backaches.    BLEEDING AND DISCHARGE  -You may have some vaginal bleeding or discharge for up to 6 weeks.  -Don't use tampons during the first week.  -Avoid sex until your bleeding has completely stopped.    GENERAL TIPS TO HELP PREVENT INFECTION  -Take any medicine that you were given to prevent infection.  -Always wash your hands before touching your stitches or the area around your vagina.  If your hands don't look dirty, you may use an alcohol hand-rub to clean them.  -Keep your nails clean and short.    BREAST OR CHEST PAIN  Your body may still try to make milk for 3 to 4 days, even though the pregnancy has ended.  If this happens, your breasts or chest area may become hard and painful.  To help feel more comfortable, try the following tips  -Wear a tight-fitting bra (like a sports bra) day and night.  -Take Motrin (ibuprofen) for pain.  -Don't try to pump or express your milk unless you are interested in donating it to a milk bank.  We can give you information about how to do that.  -Avoid breast or chest stimulation (during sex, for example).  -Consider letting loved ones know if hugs are painful.  Your body will stop making milk in about a week.    RETURNING TO WORK  Your body may be ready to return to work after 24 hours.  But emotionally, many patients need days or weeks before they feel ready to go back.  Your doctor can write a note to excuse you from work.    Once you go back to work, be prepared for some adjustment time.  Many people may be unaware that your pregnancy has " "ended.  This can be difficult.  You may find it helpful to tell a few close coworkers about your loss and ask them to quietly spread the word.    FOLLOW-UP VISITS  It is very important to make a follow-up visit with your doctor, even if you a feeling better.  Your doctor will tell you when you should come in.  -During this visit, we will make sure that you are healing physically and emotionally.  This is a good time to talk through your experience and discuss other follow-up visits.  -If you see a doctor in the next 12 weeks for something else, be sure to tell the doctor about your pregnancy and the date it ended.  -If you decide to get pregnant again, we urge you to talk to your provider early and often.  Talk about any concerns you may have, such as how to take care of yourself during another pregnancy.    WHEN TO SEEK MEDICAL HELP  -Call 911 or go to the Emergency Department if you have thoughts of harming yourself or others.  -Call your clinic if you have any of these issues:  Fever over 100.4F (38C) that lasts more than 2 days.  2.  You soak more than one maxi pad or sanitary napkin with blood within 1 hour.  3.  You pass blood clots larger than a golf ball.  4.  Discharge from your vagina that smells bad.  5.  Very bad pain.  6.  Problems coping with sadness, anxiety or depression.        Loss Discharge Instructions   We suggest you see your doctor within one to two weeks to check on your physical and emotional recovery.  This will be a good time to talk thru your experience and discuss further follow-up.       The Blues and Grief  After delivery you will have hormone changes and strong emotions, often referred to as the \"baby blues\".  You may find yourself easily upset, tearful or angry.  In addition, you will be grieving for your own loss.  You may feel terribly tired and not want to face friends, family or new babies.    Your feelings will be hard to predict during this time.  You may have many ups " and downs.  Be kind and patient with yourself.    No two people grieve the same way.  This is true of spouses and partners.  Try to have open communication, but don't depend solely on each other for support.  Reach out to friends, family, clergy and your health care providers.  You don't have to handle this alone.    Normal grief after a pregnancy or  loss often lasts for weeks or months.  Over time, you will have more good days than bad ones.  The first year is usually the hardest as you face significant dates and events for the first time.    At first, your sadness or anxiety may keep you from sleeping, eating, being with others or getting out of the house.  If, after a week, you aren't able to take care of basic daily tasks, please call your care provider right away.  It could be more serious than the blues or grief.  Going back to work:  Even though you did not bring home a living baby, you and your partner have a right to any family and bereavement leave benefits your employer offers.  When you do return to work, be prepared for some adjustment time.    Call your health care provider if you have any of these symptoms:  You soak a sanitary pad with blood within 1 hour, or you see blood clots larger than a golf ball.  Bleeding that lasts more than 6 weeks.  Vaginal discharge that smells bad.   A fever above 100.4 F (38 C) or higher (temperature taken under your tongue), with or without chills.  Severe, pain, cramping or tenderness in your lower belly area.  Pain that increases or does not go away from an episiotomy or perineal tear.  Increases pain, swelling, redness or fluid around your stitches.  A more frequent or urgent need to urinate (pee), or it burns when you pee.  Redness, swelling or pain around a vein in your leg.  Problems with coping with sadness, anxiety, or depression.  Your breasts are engorged (hard and swollen), red and very tender and you have a fever.   Nausea and vomiting.  Chest pain  and cough or are gasping for air.  You have questions or concerns after you return home.    Keep your hands clean:  Always wash your hands before touching your perineal area and stitches.  This helps reduce your risk of infection.  If your hands aren't dirty, you may use an alcohol hand-rub to clean your hands. Keep your nails clean and short.

## 2022-05-03 NOTE — ANESTHESIA CARE TRANSFER NOTE
Patient: Arnulfo Coker    Procedure: Procedure(s):  DILATION AND CURETTAGE, UTERUS, USING SUCTION, WITH ULTRASOUND GUIDANCE       Diagnosis: * No pre-op diagnosis entered *  Diagnosis Additional Information: No value filed.    Anesthesia Type:   No value filed.     Note:    Oropharynx: oropharynx clear of all foreign objects  Level of Consciousness: awake  Oxygen Supplementation: face mask  Level of Supplemental Oxygen (L/min / FiO2): 6  Independent Airway: airway patency satisfactory and stable  Dentition: dentition unchanged  Vital Signs Stable: post-procedure vital signs reviewed and stable  Report to RN Given: handoff report given  Patient transferred to: PACU    Handoff Report: Identifed the Patient, Identified the Reponsible Provider, Reviewed the pertinent medical history, Discussed the surgical course, Reviewed Intra-OP anesthesia mangement and issues during anesthesia, Set expectations for post-procedure period and Allowed opportunity for questions and acknowledgement of understanding      Vitals:  Vitals Value Taken Time   BP     Temp     Pulse 87 05/03/22 1711   Resp 15 05/03/22 1711   SpO2 100 % 05/03/22 1711   Vitals shown include unvalidated device data.    Electronically Signed By: ANTHONY Montero CRNA  May 3, 2022  5:13 PM

## 2022-05-03 NOTE — LETTER
[facility]  [address]  [facility] [address]  [phone]          May 3, 2022    RE:  Arnulfo Coker                                                                                                                                                       [address] [place] [address]          To whom it may concern:    Arnulfo Coker is under my professional care for    Incomplete   Anemia, unspecified type  Retained products of conception after miscarriage She  may return to work with the following: The employee is UNABLE to return to work until 2022. Thereafter, she is able to return to full work duties, without restrictions.      Sincerely,           Skye Suárez MD on 5/3/2022 at 5:35 PM

## 2022-05-03 NOTE — ED NOTES
Chux pad on bed was saturated with blood. Patient was cleaned up and pad was changed. MD at bedside currently doing pelvic exam.

## 2022-05-03 NOTE — H&P
"  May 3, 2022    Arnulfo Coker  [id number]            OB Admit History & Physical      Ms. Coker  is here with miscarriage in progress.    She has noticed increase cramping, pain, and heavy bleeding over the course of the day. She was seen in clinic, where she passed a    Patient's last menstrual period was 2022.   Her Estimated Date of Delivery: Data Unavailable  , making her Unknown  wks.      Estimated body mass index is 22.71 kg/m  as calculated from the following:    Height as of 7/15/21: 1.702 m (5' 7\").    Weight as of 7/15/21: 65.8 kg (145 lb).    See prenatal for labs. RH positive    Recent US:    FINDINGS:    No intrauterine products of conception demonstrated.  Endometrial stripe: 4.1 cm. Cannot exclude a submucosal fibroid or  retained products of conception for this appearance as well.  Right ovary: Unremarkable.  Left ovary: Unremarkable.  Adnexal mass: None.  Free pelvic fluid: None.                                                                   IMPRESSION: No convincing evidence of intrauterine products of  conception. Differential includes spontaneous  and normal  pregnancy with incorrect dates. Ectopic pregnancy is not excluded.  Followup as clinically indicated. No evidence for hemorrhage.    FINDINGS:     No gestational sac, yolk sac, fetal pole or cardiac activity identified. Fibroid mid left uterus measures 3.7 x 3.9 x 4.3 cm. Focal area of fluid within the endometrial space somewhat irregular in shape measuring 1.9 x 0.8 x 1.5 cm. Anterior to this is a second round anechoic area measuring 9 mm x 8 mm x 7 mm.   Right Ovary: Not seen.   Left Ovary: Measures 3.0 x 1.9 x 2.0 cm and Appears unremarkable.     No suspicious adnexal masses.   Free Fluid: No significant free fluid.     GA by LMP::  11w5d   GA by Prior US:  N/A   GA by today's US:  N/A   SUSHMA by today's US: N/A     IMPRESSION:   1. No definite intrauterine pregnancy identified.   2. 2 areas of fluid within the " endometrial space as described above, nonspecific.      She is a 32 year old   Her OB history:   OB History    Para Term  AB Living   1 0 0 0 0 0   SAB IAB Ectopic Multiple Live Births   0 0 0 0 0      # Outcome Date GA Lbr Ramon/2nd Weight Sex Delivery Anes PTL Lv   1 Current                   No past medical history on file.     No past surgical history on file.      Current Outpatient Medications   Medication Sig Dispense Refill     ibuprofen (ADVIL/MOTRIN) 800 MG tablet Take 1 tablet (800 mg) by mouth every 6 hours as needed for other (mild and/or inflammatory pain) 30 tablet 0       Allergies: Patient has no known allergies.      REVIEW OF SYSTEMS:  NEUROLOGIC:  Negative  EYES:  Negative  ENT:  Negative  GI:  Negative  BREAST:  Negative  :  Negative  GYN:  Negative  CV:  Negative  PULMONARY:  Negative  MUSCULOSKELETAL:  Negative  PSYCH:  Negative        Social History     Socioeconomic History     Marital status:      Spouse name: Not on file     Number of children: Not on file     Years of education: Not on file     Highest education level: Not on file   Occupational History     Not on file   Tobacco Use     Smoking status: Not on file     Smokeless tobacco: Not on file   Substance and Sexual Activity     Alcohol use: Not on file     Drug use: Not on file     Sexual activity: Not on file   Other Topics Concern     Not on file   Social History Narrative     Not on file     Social Determinants of Health     Financial Resource Strain: Not on file   Food Insecurity: Not on file   Transportation Needs: Not on file   Physical Activity: Not on file   Stress: Not on file   Social Connections: Not on file   Intimate Partner Violence: Not on file   Housing Stability: Not on file      No family history on file.    Alert Awake in NAD  HEENT grossly normal  Neck: no lymphadenopathy or thryoidomegaly  Lungs CTAB  Back no spinal or CVAT  Heart tachycardia  ABD s/nd/nt  Pelvic:  no fluid noted, copious  blood noted  Cervix is 1 cm / 50 % effaced   EXT:  no edema or calf tenderness  Neuro:  Grossly intact    Assessment/Plan    Anembryonic gestation  -as evidenced on US in clinic    Miscarriage  -QBL in clinic 200mL, in ED 500mL  -evolving tachycardia, hypotension  -Rh+    Proceed with suction D&C  -risks, benefits, and alternatives reviewed   -pt accepts these, and agrees to proceed      Skye Suárez MD   Dept of OB/GYN  May 3, 2022

## 2022-05-03 NOTE — DISCHARGE SUMMARY
[facility] Discharge Summary    Arnulfo Coker MRN# [id number]   Age: 32 year old YOB: 1990     Date of Admission:  5/3/2022  Date of Discharge::  5/3/2022  Admitting Physician:  Skye Suárez MD  Discharge Physician:  Skye Suárez MD     Home clinic: [facility]          Admission Diagnoses:   Incomplete  [O03.4]  Anemia, unspecified type [D64.9]  Retained products of conception after miscarriage [O03.4]          Discharge Diagnosis:   Same  S/p D&C          Procedures:   Procedure(s): Suction D&C       No other procedures performed during this admission           Medications Prior to Admission:     Medications Prior to Admission   Medication Sig Dispense Refill Last Dose     cyclobenzaprine (FLEXERIL) 10 MG tablet Take 0.5-1 tablets (5-10 mg) by mouth 3 times daily as needed for other (low back pain) 20 tablet 0              Discharge Medications:     Current Discharge Medication List      START taking these medications    Details   ibuprofen (ADVIL/MOTRIN) 800 MG tablet Take 1 tablet (800 mg) by mouth every 6 hours as needed for other (mild and/or inflammatory pain)  Qty: 30 tablet, Refills: 0    Associated Diagnoses: Incomplete ; Retained products of conception after miscarriage         CONTINUE these medications which have NOT CHANGED    Details   cyclobenzaprine (FLEXERIL) 10 MG tablet Take 0.5-1 tablets (5-10 mg) by mouth 3 times daily as needed for other (low back pain)  Qty: 20 tablet, Refills: 0                   Consultations:   No consultations were requested during this admission          Brief History of  Admission:   Pt presented to the ED with miscarriage in progress. She was experiencing significant pain and bleeding, with increasing tachycardia and decreasing blood pressure. She was counseled, and agreed to suction D&C with ultrasound guidance.           Hospital Course:   The patient's hospital course was  unremarkable.  She recovered as anticipated and experienced no post-operative complications.     Post-partum hemoglobin:   Hemoglobin   Date Value Ref Range Status   05/03/2022 8.7 (L) 11.7 - 15.7 g/dL Final   03/12/2011 13.2 11.7 - 15.7 g/dL Final             Discharge Instructions and Follow-Up:   Discharge diet: Regular   Discharge activity: Activity as tolerated   Discharge follow-up: Follow up with primary care provider in 2 weeks   Wound care: Drink plenty of fluids  Ice to area for comfort           Discharge Disposition:   Discharged to home      Attestation:  I have reviewed today's vital signs, notes, medications, labs and imaging.  Amount of time performed on this discharge summary: 20 minutes.    Skye Suárez MD

## 2022-05-04 NOTE — ANESTHESIA POSTPROCEDURE EVALUATION
Patient: Arnulfo Coker    Procedure: Procedure(s):  DILATION AND CURETTAGE, UTERUS, USING SUCTION, WITH ULTRASOUND GUIDANCE       Anesthesia Type:  General    Note:  Disposition: Outpatient   Postop Pain Control: Uneventful            Sign Out: Well controlled pain   PONV: No   Neuro/Psych: Uneventful            Sign Out: Acceptable/Baseline neuro status   Airway/Respiratory: Uneventful            Sign Out: Acceptable/Baseline resp. status   CV/Hemodynamics: Uneventful            Sign Out: Acceptable CV status; No obvious hypovolemia; No obvious fluid overload   Other NRE: NONE   DID A NON-ROUTINE EVENT OCCUR? No           Last vitals:  Vitals Value Taken Time   BP 98/63 05/03/22 1830   Temp 98.8  F (37.1  C) 05/03/22 1820   Pulse 85 05/03/22 1835   Resp 9 05/03/22 1835   SpO2 100 % 05/03/22 1835   Vitals shown include unvalidated device data.    Electronically Signed By: Ashish Conti MD  May 3, 2022  10:14 PM

## 2022-05-04 NOTE — OR NURSING
During post op phone call patient states when ever she tries to stand up or walk her heart races and she feels light headed and needs to sit back down. Can not walk to bathroom wi/o sitting a couple of times.   home for safety.  States spotting on pad.  Gave phone number for Dr. Suárez and instructed to call now and to come to ER for further evaluation.  Verbalized hesitancy, told her this needed to be done today for safety and to make sure her blood levels had not dropped further requiring intervention and to make sure there is not continued bleeding.  Again verbalized reluctance as she does not want a blood transfusion.  Instructed to still call MD and to come to ER for evaluation.  Patient stated she will call MD and come to ER.

## 2022-05-05 LAB
PATH REPORT.COMMENTS IMP SPEC: NORMAL
PATH REPORT.FINAL DX SPEC: NORMAL
PATH REPORT.GROSS SPEC: NORMAL
PATH REPORT.MICROSCOPIC SPEC OTHER STN: NORMAL
PATH REPORT.RELEVANT HX SPEC: NORMAL
PHOTO IMAGE: NORMAL

## 2022-05-05 PROCEDURE — 88305 TISSUE EXAM BY PATHOLOGIST: CPT | Mod: 26 | Performed by: PATHOLOGY

## 2022-05-06 ENCOUNTER — HOSPITAL ENCOUNTER (EMERGENCY)
Facility: CLINIC | Age: 32
Discharge: HOME | End: 2022-05-07
Attending: EMERGENCY MEDICINE | Admitting: EMERGENCY MEDICINE
Payer: COMMERCIAL

## 2022-05-06 ENCOUNTER — APPOINTMENT (OUTPATIENT)
Dept: ULTRASOUND IMAGING | Facility: CLINIC | Age: 32
End: 2022-05-06
Attending: EMERGENCY MEDICINE
Payer: COMMERCIAL

## 2022-05-06 DIAGNOSIS — D62 ANEMIA DUE TO BLOOD LOSS, ACUTE: ICD-10-CM

## 2022-05-06 DIAGNOSIS — D25.9 UTERINE LEIOMYOMA, UNSPECIFIED LOCATION: ICD-10-CM

## 2022-05-06 LAB
ABO/RH(D): NORMAL
ANION GAP SERPL CALCULATED.3IONS-SCNC: 8 MMOL/L (ref 3–14)
ANTIBODY SCREEN: NEGATIVE
B-HCG SERPL-ACNC: 302 IU/L (ref 0–5)
BASOPHILS # BLD AUTO: 0 10E3/UL (ref 0–0.2)
BASOPHILS NFR BLD AUTO: 1 %
BLD PROD TYP BPU: NORMAL
BLD PROD TYP BPU: NORMAL
BLOOD COMPONENT TYPE: NORMAL
BLOOD COMPONENT TYPE: NORMAL
BUN SERPL-MCNC: 5 MG/DL (ref 7–30)
CALCIUM SERPL-MCNC: 8.7 MG/DL (ref 8.5–10.1)
CHLORIDE BLD-SCNC: 106 MMOL/L (ref 94–109)
CO2 SERPL-SCNC: 27 MMOL/L (ref 20–32)
CODING SYSTEM: NORMAL
CODING SYSTEM: NORMAL
CREAT SERPL-MCNC: 0.59 MG/DL (ref 0.52–1.04)
CROSSMATCH: NORMAL
CROSSMATCH: NORMAL
EOSINOPHIL # BLD AUTO: 0.1 10E3/UL (ref 0–0.7)
EOSINOPHIL NFR BLD AUTO: 1 %
ERYTHROCYTE [DISTWIDTH] IN BLOOD BY AUTOMATED COUNT: 16.3 % (ref 10–15)
GFR SERPL CREATININE-BSD FRML MDRD: >90 ML/MIN/1.73M2
GLUCOSE BLD-MCNC: 100 MG/DL (ref 70–99)
HCT VFR BLD AUTO: 18.5 % (ref 35–47)
HGB BLD-MCNC: 5.6 G/DL (ref 11.7–15.7)
IMM GRANULOCYTES # BLD: 0.1 10E3/UL
IMM GRANULOCYTES NFR BLD: 1 %
ISSUE DATE AND TIME: NORMAL
ISSUE DATE AND TIME: NORMAL
LYMPHOCYTES # BLD AUTO: 2.3 10E3/UL (ref 0.8–5.3)
LYMPHOCYTES NFR BLD AUTO: 26 %
MCH RBC QN AUTO: 27.9 PG (ref 26.5–33)
MCHC RBC AUTO-ENTMCNC: 30.3 G/DL (ref 31.5–36.5)
MCV RBC AUTO: 92 FL (ref 78–100)
MONOCYTES # BLD AUTO: 0.8 10E3/UL (ref 0–1.3)
MONOCYTES NFR BLD AUTO: 9 %
NEUTROPHILS # BLD AUTO: 5.4 10E3/UL (ref 1.6–8.3)
NEUTROPHILS NFR BLD AUTO: 62 %
NRBC # BLD AUTO: 0 10E3/UL
NRBC BLD AUTO-RTO: 0 /100
PLATELET # BLD AUTO: 132 10E3/UL (ref 150–450)
POTASSIUM BLD-SCNC: 3.5 MMOL/L (ref 3.4–5.3)
RBC # BLD AUTO: 2.01 10E6/UL (ref 3.8–5.2)
SODIUM SERPL-SCNC: 141 MMOL/L (ref 133–144)
SPECIMEN EXPIRATION DATE: NORMAL
UNIT ABO/RH: NORMAL
UNIT ABO/RH: NORMAL
UNIT NUMBER: NORMAL
UNIT NUMBER: NORMAL
UNIT STATUS: NORMAL
UNIT STATUS: NORMAL
UNIT TYPE ISBT: 5100
UNIT TYPE ISBT: 5100
WBC # BLD AUTO: 8.6 10E3/UL (ref 4–11)

## 2022-05-06 PROCEDURE — 96365 THER/PROPH/DIAG IV INF INIT: CPT

## 2022-05-06 PROCEDURE — 250N000011 HC RX IP 250 OP 636: Performed by: EMERGENCY MEDICINE

## 2022-05-06 PROCEDURE — 36430 TRANSFUSION BLD/BLD COMPNT: CPT

## 2022-05-06 PROCEDURE — 36415 COLL VENOUS BLD VENIPUNCTURE: CPT | Performed by: EMERGENCY MEDICINE

## 2022-05-06 PROCEDURE — 84702 CHORIONIC GONADOTROPIN TEST: CPT | Performed by: EMERGENCY MEDICINE

## 2022-05-06 PROCEDURE — 76856 US EXAM PELVIC COMPLETE: CPT

## 2022-05-06 PROCEDURE — 85025 COMPLETE CBC W/AUTO DIFF WBC: CPT | Performed by: EMERGENCY MEDICINE

## 2022-05-06 PROCEDURE — 86923 COMPATIBILITY TEST ELECTRIC: CPT | Performed by: EMERGENCY MEDICINE

## 2022-05-06 PROCEDURE — 86901 BLOOD TYPING SEROLOGIC RH(D): CPT | Performed by: EMERGENCY MEDICINE

## 2022-05-06 PROCEDURE — 250N000013 HC RX MED GY IP 250 OP 250 PS 637: Performed by: EMERGENCY MEDICINE

## 2022-05-06 PROCEDURE — 96375 TX/PRO/DX INJ NEW DRUG ADDON: CPT

## 2022-05-06 PROCEDURE — P9016 RBC LEUKOCYTES REDUCED: HCPCS | Performed by: EMERGENCY MEDICINE

## 2022-05-06 PROCEDURE — 99285 EMERGENCY DEPT VISIT HI MDM: CPT | Mod: 25

## 2022-05-06 PROCEDURE — 80048 BASIC METABOLIC PNL TOTAL CA: CPT | Performed by: EMERGENCY MEDICINE

## 2022-05-06 PROCEDURE — 96361 HYDRATE IV INFUSION ADD-ON: CPT

## 2022-05-06 PROCEDURE — 258N000003 HC RX IP 258 OP 636: Performed by: EMERGENCY MEDICINE

## 2022-05-06 PROCEDURE — 86850 RBC ANTIBODY SCREEN: CPT | Performed by: EMERGENCY MEDICINE

## 2022-05-06 RX ORDER — MAGNESIUM SULFATE HEPTAHYDRATE 40 MG/ML
2 INJECTION, SOLUTION INTRAVENOUS ONCE
Status: COMPLETED | OUTPATIENT
Start: 2022-05-06 | End: 2022-05-06

## 2022-05-06 RX ORDER — IBUPROFEN 600 MG/1
600 TABLET, FILM COATED ORAL ONCE
Status: COMPLETED | OUTPATIENT
Start: 2022-05-06 | End: 2022-05-06

## 2022-05-06 RX ORDER — METOCLOPRAMIDE HYDROCHLORIDE 5 MG/ML
10 INJECTION INTRAMUSCULAR; INTRAVENOUS ONCE
Status: COMPLETED | OUTPATIENT
Start: 2022-05-06 | End: 2022-05-06

## 2022-05-06 RX ORDER — BUTALBITAL, ACETAMINOPHEN AND CAFFEINE 50; 325; 40 MG/1; MG/1; MG/1
1 TABLET ORAL ONCE
Status: COMPLETED | OUTPATIENT
Start: 2022-05-06 | End: 2022-05-06

## 2022-05-06 RX ORDER — DIPHENHYDRAMINE HYDROCHLORIDE 50 MG/ML
25 INJECTION INTRAMUSCULAR; INTRAVENOUS ONCE
Status: COMPLETED | OUTPATIENT
Start: 2022-05-06 | End: 2022-05-06

## 2022-05-06 RX ADMIN — DIPHENHYDRAMINE HYDROCHLORIDE 25 MG: 50 INJECTION INTRAMUSCULAR; INTRAVENOUS at 19:03

## 2022-05-06 RX ADMIN — MAGNESIUM SULFATE HEPTAHYDRATE 2 G: 40 INJECTION, SOLUTION INTRAVENOUS at 19:56

## 2022-05-06 RX ADMIN — SODIUM CHLORIDE 1000 ML: 9 INJECTION, SOLUTION INTRAVENOUS at 19:03

## 2022-05-06 RX ADMIN — IBUPROFEN 600 MG: 600 TABLET ORAL at 17:12

## 2022-05-06 RX ADMIN — METOCLOPRAMIDE HYDROCHLORIDE 10 MG: 5 INJECTION INTRAMUSCULAR; INTRAVENOUS at 19:03

## 2022-05-06 RX ADMIN — BUTALBITAL, ACETAMINOPHEN AND CAFFEINE 1 TABLET: 50; 325; 40 TABLET ORAL at 20:57

## 2022-05-06 ASSESSMENT — ENCOUNTER SYMPTOMS
HEADACHES: 1
FEVER: 0
VOMITING: 0

## 2022-05-06 NOTE — ED PROVIDER NOTES
History     Chief Complaint:    Headache      HPI   Arnulfo Coker is a 32 year old female who presents with a headache.  The patient states that for about the last 2 days she has had a frontal headache.  She has tried alternating Tylenol and ibuprofen but has not had relief.  She has not vomited.    She notes that she had a miscarriage leading to a D&C on 5/3/2022.  I reviewed the notes from her ED visit that day as well as the D&C.  Her last menstrual period was 2/9/2022.  Reportedly, she developed light bleeding on April 29.  She initially had gone to urgent care but was referred to the emergency department.  Exam on that day noted a large clot in the vaginal vault but persistent bleeding through the cervical os so the patient was taken for D&C and was discharged later that day.  The patient notes that her bleeding has markedly improved though she has some occasional spotting.    The patient states that she does have baseline anemia and takes iron even before she was pregnant.    Separately, the patient was diagnosed with a urinary infection on 4/14/2022.  I reviewed this in care everywhere and noted nitrite positive, trace leukocyte Estrace positive, and moderate epithelial cells without any significant WBCs on microscopy.  There does not appear to be a culture result available.  The patient was reportedly placed on amoxicillin for this.      Review of Systems   Constitutional: Negative for fever.   Gastrointestinal: Negative for vomiting.   Genitourinary: Positive for vaginal bleeding (spotting).   Neurological: Positive for headaches. Negative for syncope.   All other systems reviewed and are negative.    Allergies:    No Known Allergies    Medications:    butalbital-acetaminophen-caffeine (ESGIC) -40 MG tablet  cyclobenzaprine (FLEXERIL) 10 MG tablet  ibuprofen (ADVIL/MOTRIN) 800 MG tablet         Past Medical History:   Past Surgical History:     No past medical history on file. Past Surgical  History:   Procedure Laterality Date     DILATION AND CURETTAGE SUCTION WITH ULTRASOUND GUIDANCE N/A 5/3/2022    Procedure: DILATION AND CURETTAGE, UTERUS, USING SUCTION, WITH ULTRASOUND GUIDANCE;  Surgeon: Skye Suárez MD;  Location: [facility] OR      Patient Active Problem List    Diagnosis Date Noted     Incomplete  2022     Priority: Medium     Anemia, unspecified type 2022     Priority: Medium     Retained products of conception after miscarriage 2022     Priority: Medium          Family History  No family history on file.    Social History:  Presents to the ED with her     Physical Exam     Patient Vitals for the past 24 hrs:   BP Temp Temp src Pulse Resp SpO2   22 0145 100/72 -- -- -- -- 100 %   22 0130 104/ -- -- -- -- 100 %   225 112/ -- -- -- -- 100 %   22 105/ -- -- -- -- 100 %   22 105/ -- -- -- -- 100 %   220 112/ -- -- -- -- 100 %   225 / 98.4  F (36.9  C) Oral -- -- 100 %   22 0000 110/ -- -- -- -- 100 %   22 104/ -- -- -- -- 100 %   22 98/ 98.4  F (36.9  C) Oral 83 -- 100 %   22 101/ -- -- -- -- 100 %   22 103/ -- -- -- -- 100 %   22/ -- -- -- -- 100 %   22 102/ -- -- -- -- 100 %   22 98/ -- -- -- -- 100 %   22/ -- -- -- -- 100 %   22 -- -- -- -- 100 %   05/06/22 2130 100/68 -- -- -- -- 100 %   22/ 98.3  F (36.8  C) Oral 83 17 --   22 100/65 -- -- 77 -- 100 %   22 98.1  F (36.7  C) -- 84 17 --   22 1945 -- -- -- -- -- 100 %   22 1930 111/62 -- -- -- -- 100 %   22 1915 -- -- -- -- -- 100 %   22 1900 100/75 -- -- -- -- --   22 1830 -- -- -- -- -- 100 %   22 1815 -- -- -- -- -- 99 %   22 1800 -- -- -- -- -- 100 %       Physical Exam  Constitutional: Vital signs reviewed  as above.   HENT:    Head: No external signs of trauma noted.   Eyes: Pupils are equal, round, and reactive to light. Mild conjunctival pallor noted.  Cardiovascular: Normal rate, regular rhythm, normal heart sounds and intact distal pulses.    Pulmonary/Chest: Effort normal and breath sounds normal. No respiratory distress. No wheezes noted.   Gastrointestinal: Soft. There is no tenderness. There is no rebound.   Musculoskeletal:   No deformities appreciated   No edema noted  Neurological:    Patient is alert and oriented to person, place, and time.    Speech is fluent, cognition is normal.   CN 2-12 intact (PERRL, EOMI, symmetric smile, equal eye squeeze and forehead raise, normal and equal sensation to bilateral forehead/cheek/chin, grossly equal hearing B/L, midline tongue protrusion with nl side-to-side movement, normal shoulder shrug).    RUE strength 4/5: , finger abd, wrist flex/ext, elbow flex/ext.    LUE strength 4/5: , finger abd, wrist flex/ext, elbow flex/ext.    RLE strength 4/5: ankle flex/ext, knee flex/ext, hip flex.    LLE strength 4/5: ankle flex/ext, knee flex/ext, hip flex.    Sensation equal in all 4 extremities.    No arm drift.     Cerebellar: Normal rapid alternating movements    Skin: Skin is warm and dry.   Psychiatric: The patient appears calm        Emergency Department Course     Imaging:  US Pelvis Complete without Transvaginal   Final Result   IMPRESSION:   1.  Large uterine fibroid, could explain patient's bleeding.   2.  Unremarkable transabdominal appearance of the endometrium.                Laboratory:  Labs Ordered and Resulted from Time of ED Arrival to Time of ED Departure   BASIC METABOLIC PANEL - Abnormal       Result Value    Sodium 141      Potassium 3.5      Chloride 106      Carbon Dioxide (CO2) 27      Anion Gap 8      Urea Nitrogen 5 (*)     Creatinine 0.59      Calcium 8.7      Glucose 100 (*)     GFR Estimate >90     HCG QUANTITATIVE PREGNANCY - Abnormal     hCG Quantitative 302 (*)    CBC WITH PLATELETS AND DIFFERENTIAL - Abnormal    WBC Count 8.6      RBC Count 2.01 (*)     Hemoglobin 5.6 (*)     Hematocrit 18.5 (*)     MCV 92      MCH 27.9      MCHC 30.3 (*)     RDW 16.3 (*)     Platelet Count 132 (*)     % Neutrophils 62      % Lymphocytes 26      % Monocytes 9      % Eosinophils 1      % Basophils 1      % Immature Granulocytes 1      NRBCs per 100 WBC 0      Absolute Neutrophils 5.4      Absolute Lymphocytes 2.3      Absolute Monocytes 0.8      Absolute Eosinophils 0.1      Absolute Basophils 0.0      Absolute Immature Granulocytes 0.1      Absolute NRBCs 0.0     TYPE AND SCREEN, ADULT    ABO/RH(D) O POS      Antibody Screen Negative      SPECIMEN EXPIRATION DATE [id number]     PREPARE RED BLOOD CELLS (UNIT)    CROSSMATCH Compatible      UNIT ABO/RH O Pos      Unit Number [id number]      Unit Status Issued      Blood Component Type Red Blood Cells      Product Code A2500C45      CODING SYSTEM NBBA450      UNIT TYPE ISBT 5100      ISSUE DATE AND TIME [id number]     PREPARE RED BLOOD CELLS (UNIT)    CROSSMATCH Compatible      UNIT ABO/RH O Pos      Unit Number [id number]      Unit Status Issued      Blood Component Type Red Blood Cells      Product Code O5074K03      CODING SYSTEM GEPC453      UNIT TYPE ISBT 5100      ISSUE DATE AND TIME [id number]     TRANSFUSE RED BLOOD CELLS (UNIT)   TRANSFUSE RED BLOOD CELLS (UNIT)   ABO/RH TYPE AND SCREEN       Procedures:      Emergency Department Course:           Reviewed:    I reviewed nursing notes, vitals, past history and care everywhere    Assessments/Consults:     ED Course as of 05/07/22 1601   Fri May 06, 2022   1721 Dr. Gorman' evaluation.   1940 Discussed results with patient and plan for transfusion. Consent signed. Pt still notes headache.   1943 D/W Dr. Atkins (OB). If patient needs admission, should go to Hospitalist service. US pending.   Sat May 07, 2022   0059 Rechecked and updated. Notes  headache is better, but still there somewhat. Also discussed presence of fibroid. Patient states that she has known about the fibroids for a long time and was supposed to have a surgical procedure to remove them. When she found out she was pregnant, that surgery was cancelled.         Interventions:  Medications   ibuprofen (ADVIL/MOTRIN) tablet 600 mg (600 mg Oral Given 5/6/22 1712)   0.9% sodium chloride BOLUS (0 mLs Intravenous Stopped 5/6/22 2039)   metoclopramide (REGLAN) injection 10 mg (10 mg Intravenous Given 5/6/22 1903)   diphenhydrAMINE (BENADRYL) injection 25 mg (25 mg Intravenous Given 5/6/22 1903)   magnesium sulfate 2 g in water intermittent infusion (0 g Intravenous Stopped 5/6/22 2058)   butalbital-acetaminophen-caffeine (ESGIC) per tablet 1 tablet (1 tablet Oral Given 5/6/22 2057)   ibuprofen (ADVIL/MOTRIN) tablet 600 mg (600 mg Oral Given 5/7/22 0132)       Disposition:  Care of the patient was transferred to my colleague, Dr. Rojas, pending completion of blood transfusion.    Impression & Plan           CMS Diagnoses:         Medical Decision Making:  This 32-year-old female patient presents to the ED due to headache.  Please see the HPI and exam for specifics.  I think the most likely etiology of the patient's symptoms is anemia.  She recently had a D&C.  I also note a uterine fibroid.  After discussion with OB, we did not think the patient necessarily had to be admitted to the hospital.  She will be given 2 units of packed red blood cells and then can be discharged.  She can follow-up outpatient for a repeat hemoglobin.  They can also discuss further monitoring and treatment of her uterine fibroids at that time as well.  She may return at anytime with new or worsening symptoms.  The patient was signed over to my colleague, Dr Rojas, for final disposition pending completion of blood transfusion.      Critical Care time:  none    Covid-19  Arnulfo Coker was evaluated during a global  COVID-19 pandemic, which necessitated consideration that the patient might be at risk for infection with the SARS-CoV-2 virus that causes COVID-19.  Applicable protocols for evaluation were followed during the patient's care. COVID-19 was considered as part of the patient's evaluation.       Diagnosis:    ICD-10-CM    1. Anemia due to blood loss, acute  D62    2. Uterine leiomyoma, unspecified location  D25.9        Discharge Medications:  Discharge Medication List as of 5/7/2022  1:37 AM      START taking these medications    Details   butalbital-acetaminophen-caffeine (ESGIC) -40 MG tablet Take 1 tablet by mouth every 6 hours as needed for migraine, Disp-20 tablet, R-0, E-PrescribeDo not exceed 4000 mg acetaminophen per day from all sources. Do not drive or operate machinery while taking this medication.                    Parth Gorman, DO  05/07/22 0059       Parth Gorman, DO  05/07/22 0059       Parth Gorman, DO  05/07/22 0117       Parth Gorman, DO  05/07/22 1602

## 2022-05-06 NOTE — ED TRIAGE NOTES
Pt c/o headache for two days.  Pt has taken tylenol with minimal relief in pain.  Recent history of D&C post miscarriage.       Triage Assessment     Row Name 05/06/22 9938       Triage Assessment (Adult)    Airway WDL WDL       Respiratory WDL    Respiratory WDL WDL       Skin Circulation/Temperature WDL    Skin Circulation/Temperature WDL WDL       Cardiac WDL    Cardiac WDL WDL       Peripheral/Neurovascular WDL    Peripheral Neurovascular WDL WDL       Cognitive/Neuro/Behavioral WDL    Cognitive/Neuro/Behavioral WDL WDL

## 2022-05-07 VITALS
RESPIRATION RATE: 17 BRPM | DIASTOLIC BLOOD PRESSURE: 72 MMHG | OXYGEN SATURATION: 100 % | HEART RATE: 83 BPM | TEMPERATURE: 98.4 F | SYSTOLIC BLOOD PRESSURE: 100 MMHG

## 2022-05-07 PROCEDURE — 250N000013 HC RX MED GY IP 250 OP 250 PS 637: Performed by: EMERGENCY MEDICINE

## 2022-05-07 RX ORDER — BUTALBITAL, ACETAMINOPHEN AND CAFFEINE 50; 325; 40 MG/1; MG/1; MG/1
1 TABLET ORAL EVERY 6 HOURS PRN
Qty: 20 TABLET | Refills: 0 | Status: ON HOLD | OUTPATIENT
Start: 2022-05-07 | End: 2024-03-25

## 2022-05-07 RX ORDER — IBUPROFEN 600 MG/1
600 TABLET, FILM COATED ORAL ONCE
Status: COMPLETED | OUTPATIENT
Start: 2022-05-07 | End: 2022-05-07

## 2022-05-07 RX ADMIN — IBUPROFEN 600 MG: 600 TABLET ORAL at 01:32

## 2022-05-07 NOTE — DISCHARGE INSTRUCTIONS
"What do you do next:   Continue your home medications unless we have specifically changed them  You can take the \"fioricet\" I have prescribed as directed.   Follow up as indicated below (you should make a follow-up appointment with your primary care clinic and have your blood work rechecked).    When do you return: If you have uncontrollable bleeding, severe lightheadedness or fainting, uncontrollable headache, or any other symptoms that concern you, please return to the ED for reevaluation.    Thank you for allowing us to care for you today.    "

## 2022-06-08 LAB
ADDITIONAL COMMENTS: NORMAL
INTERPRETATION: NORMAL
ISCN: NORMAL
METHODS: NORMAL

## 2022-11-14 NOTE — ED TRIAGE NOTES
Patient brought in here from [facility] for Vag bleeding. 3 months pregnant. Heavy vaginal bleeding. Started on the 29th, was spotting then. Heavy bleeding started at 0700. Cramping Pain  
no

## 2023-10-28 ENCOUNTER — TRANSFERRED RECORDS (OUTPATIENT)
Dept: HEALTH INFORMATION MANAGEMENT | Facility: CLINIC | Age: 33
End: 2023-10-28

## 2023-12-26 NOTE — ED TRIAGE NOTES
Here for mid lower back pain radiating to abdomen started two days ago associated with nausea. Stated that she started vaginal spotting about 4 days ago, then started heavy bleeding with blood clots. Took tylenol about 3 hours ago. ABCs intact.    Patient called states she is really sick    pt has body ache all over     wondering what she should do      pls advise

## 2023-12-28 ENCOUNTER — TRANSFERRED RECORDS (OUTPATIENT)
Dept: HEALTH INFORMATION MANAGEMENT | Facility: CLINIC | Age: 33
End: 2023-12-28
Payer: COMMERCIAL

## 2023-12-28 LAB
ABO (EXTERNAL): NORMAL
GROUP B STREPTOCOCCUS (EXTERNAL): POSITIVE
GROUP B STREPTOCOCCUS (EXTERNAL): POSITIVE
HEMOGLOBIN (EXTERNAL): 13.2 G/DL (ref 11–16)
HEPATITIS B SURFACE ANTIGEN (EXTERNAL): REACTIVE
HEPATITIS B SURFACE ANTIGEN (EXTERNAL): REACTIVE
HIV1+2 AB SERPL QL IA: NEGATIVE
HIV1+2 AB SERPL QL IA: NONREACTIVE
PHQ9 SCORE: 2
PLATELET COUNT (EXTERNAL): 141 10E3/UL (ref 150–400)
RH (EXTERNAL): POSITIVE
RUBELLA ANTIBODY IGG (EXTERNAL): NORMAL
RUBELLA ANTIBODY IGG (EXTERNAL): NORMAL
TREPONEMA PALLIDUM ANTIBODY (EXTERNAL): NONREACTIVE

## 2024-01-04 ENCOUNTER — TRANSFERRED RECORDS (OUTPATIENT)
Dept: HEALTH INFORMATION MANAGEMENT | Facility: CLINIC | Age: 34
End: 2024-01-04
Payer: COMMERCIAL

## 2024-01-04 LAB — TSH SERPL-ACNC: 0.68 MIU/L (ref 0.4–4.5)

## 2024-01-05 ENCOUNTER — MEDICAL CORRESPONDENCE (OUTPATIENT)
Dept: HEALTH INFORMATION MANAGEMENT | Facility: CLINIC | Age: 34
End: 2024-01-05
Payer: COMMERCIAL

## 2024-01-08 ENCOUNTER — TRANSCRIBE ORDERS (OUTPATIENT)
Dept: OTHER | Age: 34
End: 2024-01-08

## 2024-01-08 DIAGNOSIS — Z34.90 PREGNANCY: Primary | ICD-10-CM

## 2024-01-10 ENCOUNTER — TELEPHONE (OUTPATIENT)
Dept: GASTROENTEROLOGY | Facility: CLINIC | Age: 34
End: 2024-01-10
Payer: COMMERCIAL

## 2024-01-10 NOTE — TELEPHONE ENCOUNTER
"Referral received for patient to be seen in hepatology for \"pregnancy and hep\". Referral was originally declined because there was not enough information in the chart to appropriately triage referral.     Contacted referring provider, Nidia Thompson with [facility] for additional information on referral. Spoke with staff at [facility]. Staff will send last office visit note and labs.    Attempted to contact patient to discuss referral. Detailed voicemail left.    GAETANO Valencia, RN, N  Hepatology Clinic  [facility]  [facility]      "

## 2024-01-10 NOTE — TELEPHONE ENCOUNTER
Records received showing hepatitis B surface antigen positive. Message sent to clinic scheduler.    FRANNY ValenciaN, RN, PHN  Hepatology Clinic  [facility]  [facility]

## 2024-01-11 ENCOUNTER — TELEPHONE (OUTPATIENT)
Dept: GASTROENTEROLOGY | Facility: CLINIC | Age: 34
End: 2024-01-11
Payer: COMMERCIAL

## 2024-01-11 NOTE — TELEPHONE ENCOUNTER
LVM; no MC; pt to schedule next avail in person, Sneha Manzano in [place] or any provider [facility]; with labs- KB 1.11.24//

## 2024-01-15 ENCOUNTER — TELEPHONE (OUTPATIENT)
Dept: GASTROENTEROLOGY | Facility: CLINIC | Age: 34
End: 2024-01-15
Payer: COMMERCIAL

## 2024-01-15 NOTE — TELEPHONE ENCOUNTER
LVM; sent letter; pt to schedule next avail in person, Sneha Manzano in [facility] or any provider [facility]; with labs; second attempt- KB 1.15.24//

## 2024-01-29 ENCOUNTER — TRANSFERRED RECORDS (OUTPATIENT)
Dept: HEALTH INFORMATION MANAGEMENT | Facility: CLINIC | Age: 34
End: 2024-01-29
Payer: COMMERCIAL

## 2024-01-30 ENCOUNTER — MEDICAL CORRESPONDENCE (OUTPATIENT)
Dept: HEALTH INFORMATION MANAGEMENT | Facility: CLINIC | Age: 34
End: 2024-01-30
Payer: COMMERCIAL

## 2024-01-30 ENCOUNTER — TRANSCRIBE ORDERS (OUTPATIENT)
Dept: MATERNAL FETAL MEDICINE | Facility: CLINIC | Age: 34
End: 2024-01-30
Payer: COMMERCIAL

## 2024-01-30 DIAGNOSIS — O26.90 PREGNANCY RELATED CONDITION, ANTEPARTUM: Primary | ICD-10-CM

## 2024-02-01 DIAGNOSIS — O26.90 PREGNANCY RELATED CONDITION, ANTEPARTUM: Primary | ICD-10-CM

## 2024-02-26 ENCOUNTER — PRE VISIT (OUTPATIENT)
Dept: MATERNAL FETAL MEDICINE | Facility: CLINIC | Age: 34
End: 2024-02-26
Payer: COMMERCIAL

## 2024-03-05 ENCOUNTER — OFFICE VISIT (OUTPATIENT)
Dept: MATERNAL FETAL MEDICINE | Facility: CLINIC | Age: 34
End: 2024-03-05
Attending: OBSTETRICS & GYNECOLOGY
Payer: COMMERCIAL

## 2024-03-05 ENCOUNTER — HOSPITAL ENCOUNTER (OUTPATIENT)
Dept: ULTRASOUND IMAGING | Facility: CLINIC | Age: 34
Discharge: HOME | End: 2024-03-05
Attending: OBSTETRICS & GYNECOLOGY
Payer: COMMERCIAL

## 2024-03-05 DIAGNOSIS — O26.90 PREGNANCY RELATED CONDITION, ANTEPARTUM: ICD-10-CM

## 2024-03-05 DIAGNOSIS — O99.282 HYPERTHYROIDISM AFFECTING PREGNANCY IN SECOND TRIMESTER: Primary | ICD-10-CM

## 2024-03-05 DIAGNOSIS — Z36.2 ENCOUNTER FOR FOLLOW-UP ULTRASOUND OF FETAL ANATOMY: ICD-10-CM

## 2024-03-05 DIAGNOSIS — O44.02 COMPLETE PLACENTA PREVIA NOS OR WITHOUT HEMORRHAGE, SECOND TRIMESTER: ICD-10-CM

## 2024-03-05 DIAGNOSIS — E05.90 HYPERTHYROIDISM AFFECTING PREGNANCY IN SECOND TRIMESTER: Primary | ICD-10-CM

## 2024-03-05 PROCEDURE — 76811 OB US DETAILED SNGL FETUS: CPT

## 2024-03-05 PROCEDURE — 76811 OB US DETAILED SNGL FETUS: CPT | Mod: 26 | Performed by: OBSTETRICS & GYNECOLOGY

## 2024-03-05 NOTE — PROGRESS NOTES
"Please see \"Imaging\" tab under \"Chart Review\" for details of today's US at the [facility].    Micky Kelley MD  Maternal-Fetal Medicine    "

## 2024-03-25 ENCOUNTER — HOSPITAL ENCOUNTER (OUTPATIENT)
Facility: HOSPITAL | Age: 34
Discharge: HOME | End: 2024-03-25
Attending: ADVANCED PRACTICE MIDWIFE | Admitting: ADVANCED PRACTICE MIDWIFE
Payer: COMMERCIAL

## 2024-03-25 ENCOUNTER — HOSPITAL ENCOUNTER (OUTPATIENT)
Facility: HOSPITAL | Age: 34
End: 2024-03-25
Admitting: ADVANCED PRACTICE MIDWIFE
Payer: COMMERCIAL

## 2024-03-25 VITALS
HEART RATE: 68 BPM | DIASTOLIC BLOOD PRESSURE: 54 MMHG | TEMPERATURE: 98 F | SYSTOLIC BLOOD PRESSURE: 82 MMHG | RESPIRATION RATE: 17 BRPM

## 2024-03-25 PROCEDURE — G0463 HOSPITAL OUTPT CLINIC VISIT: HCPCS

## 2024-03-25 ASSESSMENT — ACTIVITIES OF DAILY LIVING (ADL)
ADLS_ACUITY_SCORE: 35
ADLS_ACUITY_SCORE: 35

## 2024-03-25 NOTE — PROGRESS NOTES
"Patient arrived ambulatory from home stating that she feel on her bottom last night. Denies any leaking of fluids, bleeding or vaginal discharge. Denies feeling any contractions or discomfort at this time. States occasional nausea that is no different then throughout pregnancy. BP low. CNM informed of BP results, denies any lightheadedness or dizziness, states normally have low BP in the \"100's/60's\". Doptoned FHTs 154bpm. Audible movement heard of baby. CNM POC may discharge home and keep next scheduled appointment plan for 24weeks gestation. Patient agrees to plan. Stated when she heard FHTs she was asking when she could go home. Discharge instructions gone through, questions and concerns answered.   "

## 2024-03-25 NOTE — DISCHARGE INSTRUCTIONS
"  Weeks 18 to 22 of Your Pregnancy: Care Instructions  At this stage you may find that your nausea and fatigue are gone. You may feel better overall and have more energy. But you might now also have some new discomforts, like sleep problems or leg cramps.    You may start to feel your baby move. These movements can feel like butterflies or bubbles.    Babies at this stage can now suck their thumbs.    Get some exercise every day.  And avoid caffeine late in the day.     Take a warm shower or bath before bed.  Try relaxation exercises to calm your mind and body.     Use extra pillows.  They can help you get comfortable.     Don't use sleeping pills or alcohol.  They could harm your baby.     For leg cramps, stretch and apply heat.  A warm bath, leg warmers, a heating pad, or a hot water bottle can help with muscle aches.   Stretches for leg cramps    Straighten your leg and bend your foot (flex your ankle) slowly upward, toward your knee. Bend your toes up and down.    Stand on a flat surface. Stretch your toes upward. For balance, hold on to the wall or something stable. If it feels okay, take small steps walking on your heels.  Follow-up care is a key part of your treatment and safety. Be sure to make and go to all appointments, and call your doctor if you are having problems. It's also a good idea to know your test results and keep a list of the medicines you take.  Where can you learn more?  Go to [email or web address]  Enter W603 in the search box to learn more about \"Weeks 18 to 22 of Your Pregnancy: Care Instructions.\"  Current as of: July 10, 2023               Content Version: 14.0    [id number] [employer].   Care instructions adapted under license by your healthcare professional. If you have questions about a medical condition or this instruction, always ask your healthcare professional. [employer] disclaims any warranty or liability for your use of this " information.

## 2024-04-08 ENCOUNTER — OFFICE VISIT (OUTPATIENT)
Dept: MATERNAL FETAL MEDICINE | Facility: CLINIC | Age: 34
End: 2024-04-08
Attending: STUDENT IN AN ORGANIZED HEALTH CARE EDUCATION/TRAINING PROGRAM
Payer: COMMERCIAL

## 2024-04-08 ENCOUNTER — HOSPITAL ENCOUNTER (OUTPATIENT)
Dept: ULTRASOUND IMAGING | Facility: CLINIC | Age: 34
Discharge: HOME | End: 2024-04-08
Attending: STUDENT IN AN ORGANIZED HEALTH CARE EDUCATION/TRAINING PROGRAM
Payer: COMMERCIAL

## 2024-04-08 DIAGNOSIS — O44.02 COMPLETE PLACENTA PREVIA NOS OR WITHOUT HEMORRHAGE, SECOND TRIMESTER: ICD-10-CM

## 2024-04-08 DIAGNOSIS — O44.02 COMPLETE PLACENTA PREVIA NOS OR WITHOUT HEMORRHAGE, SECOND TRIMESTER: Primary | ICD-10-CM

## 2024-04-08 DIAGNOSIS — Z36.2 ENCOUNTER FOR FOLLOW-UP ULTRASOUND OF FETAL ANATOMY: ICD-10-CM

## 2024-04-08 PROCEDURE — 76816 OB US FOLLOW-UP PER FETUS: CPT

## 2024-04-08 PROCEDURE — 76817 TRANSVAGINAL US OBSTETRIC: CPT | Mod: 26 | Performed by: STUDENT IN AN ORGANIZED HEALTH CARE EDUCATION/TRAINING PROGRAM

## 2024-04-08 PROCEDURE — 76816 OB US FOLLOW-UP PER FETUS: CPT | Mod: 26 | Performed by: STUDENT IN AN ORGANIZED HEALTH CARE EDUCATION/TRAINING PROGRAM

## 2024-04-08 PROCEDURE — 99213 OFFICE O/P EST LOW 20 MIN: CPT | Mod: 25 | Performed by: STUDENT IN AN ORGANIZED HEALTH CARE EDUCATION/TRAINING PROGRAM

## 2024-04-08 NOTE — PROGRESS NOTES
"Please see \"Imaging\" tab under \"Chart Review\" for details of today's visit.    Jessica Rome    "

## 2024-05-06 ENCOUNTER — TRANSFERRED RECORDS (OUTPATIENT)
Dept: HEALTH INFORMATION MANAGEMENT | Facility: CLINIC | Age: 34
End: 2024-05-06
Payer: COMMERCIAL

## 2024-05-28 ENCOUNTER — HOSPITAL ENCOUNTER (OUTPATIENT)
Dept: ULTRASOUND IMAGING | Facility: CLINIC | Age: 34
Discharge: HOME | End: 2024-05-28
Attending: STUDENT IN AN ORGANIZED HEALTH CARE EDUCATION/TRAINING PROGRAM
Payer: COMMERCIAL

## 2024-05-28 ENCOUNTER — OFFICE VISIT (OUTPATIENT)
Dept: MATERNAL FETAL MEDICINE | Facility: CLINIC | Age: 34
End: 2024-05-28
Attending: STUDENT IN AN ORGANIZED HEALTH CARE EDUCATION/TRAINING PROGRAM
Payer: COMMERCIAL

## 2024-05-28 DIAGNOSIS — O44.02 COMPLETE PLACENTA PREVIA NOS OR WITHOUT HEMORRHAGE, SECOND TRIMESTER: ICD-10-CM

## 2024-05-28 DIAGNOSIS — E05.90 HYPERTHYROIDISM AFFECTING PREGNANCY IN THIRD TRIMESTER: ICD-10-CM

## 2024-05-28 DIAGNOSIS — O99.283 HYPERTHYROIDISM AFFECTING PREGNANCY IN THIRD TRIMESTER: ICD-10-CM

## 2024-05-28 DIAGNOSIS — O44.03 ANTEPARTUM PLACENTA PREVIA WITHOUT HEMORRHAGE IN THIRD TRIMESTER: Primary | ICD-10-CM

## 2024-05-28 PROCEDURE — 76816 OB US FOLLOW-UP PER FETUS: CPT

## 2024-05-28 PROCEDURE — 76816 OB US FOLLOW-UP PER FETUS: CPT | Mod: 26 | Performed by: OBSTETRICS & GYNECOLOGY

## 2024-05-28 NOTE — PROGRESS NOTES
"Please see \"Imaging\" tab under \"Chart Review\" for details of today's US at the [facility].    Micky Kelley MD  Maternal-Fetal Medicine    "

## 2024-05-28 NOTE — NURSING NOTE
Patient presents to [facility] for RL2 at 30w3d due to complete previa. Positive fetal movement. Denies LOF, vaginal bleeding or cramping/contractions. SBAR given to ANTONIETA MD, see their note in Epic.

## 2024-05-29 ENCOUNTER — TRANSFERRED RECORDS (OUTPATIENT)
Dept: HEALTH INFORMATION MANAGEMENT | Facility: CLINIC | Age: 34
End: 2024-05-29
Payer: COMMERCIAL

## 2024-05-31 ENCOUNTER — TRANSFERRED RECORDS (OUTPATIENT)
Dept: HEALTH INFORMATION MANAGEMENT | Facility: CLINIC | Age: 34
End: 2024-05-31

## 2024-06-03 ENCOUNTER — MEDICAL CORRESPONDENCE (OUTPATIENT)
Dept: HEALTH INFORMATION MANAGEMENT | Facility: CLINIC | Age: 34
End: 2024-06-03
Payer: COMMERCIAL

## 2024-06-03 ENCOUNTER — TRANSCRIBE ORDERS (OUTPATIENT)
Dept: MATERNAL FETAL MEDICINE | Facility: CLINIC | Age: 34
End: 2024-06-03
Payer: COMMERCIAL

## 2024-06-03 DIAGNOSIS — O26.90 PREGNANCY RELATED CONDITION, ANTEPARTUM: Primary | ICD-10-CM

## 2024-06-04 DIAGNOSIS — O99.119 GESTATIONAL THROMBOCYTOPENIA (H): Primary | ICD-10-CM

## 2024-06-04 DIAGNOSIS — D69.6 GESTATIONAL THROMBOCYTOPENIA (H): Primary | ICD-10-CM

## 2024-06-13 ENCOUNTER — OFFICE VISIT (OUTPATIENT)
Dept: MATERNAL FETAL MEDICINE | Facility: CLINIC | Age: 34
End: 2024-06-13
Attending: OBSTETRICS & GYNECOLOGY
Payer: COMMERCIAL

## 2024-06-13 ENCOUNTER — LAB (OUTPATIENT)
Dept: LAB | Facility: CLINIC | Age: 34
End: 2024-06-13
Attending: OBSTETRICS & GYNECOLOGY
Payer: COMMERCIAL

## 2024-06-13 VITALS — RESPIRATION RATE: 16 BRPM | DIASTOLIC BLOOD PRESSURE: 65 MMHG | SYSTOLIC BLOOD PRESSURE: 97 MMHG

## 2024-06-13 DIAGNOSIS — D69.6 GESTATIONAL THROMBOCYTOPENIA (H): ICD-10-CM

## 2024-06-13 DIAGNOSIS — O99.119 GESTATIONAL THROMBOCYTOPENIA (H): ICD-10-CM

## 2024-06-13 DIAGNOSIS — B19.10 HEPATITIS B INFECTION: ICD-10-CM

## 2024-06-13 DIAGNOSIS — D69.6 BENIGN GESTATIONAL THROMBOCYTOPENIA IN THIRD TRIMESTER (H): Primary | ICD-10-CM

## 2024-06-13 DIAGNOSIS — O99.113 BENIGN GESTATIONAL THROMBOCYTOPENIA IN THIRD TRIMESTER (H): Primary | ICD-10-CM

## 2024-06-13 LAB — PLATELET # BLD AUTO: 75 10E3/UL (ref 150–450)

## 2024-06-13 PROCEDURE — 36415 COLL VENOUS BLD VENIPUNCTURE: CPT

## 2024-06-13 PROCEDURE — 99203 OFFICE O/P NEW LOW 30 MIN: CPT | Mod: GC | Performed by: OBSTETRICS & GYNECOLOGY

## 2024-06-13 PROCEDURE — 85049 AUTOMATED PLATELET COUNT: CPT

## 2024-06-13 PROCEDURE — G0463 HOSPITAL OUTPT CLINIC VISIT: HCPCS | Performed by: OBSTETRICS & GYNECOLOGY

## 2024-06-13 RX ORDER — FAMOTIDINE 20 MG/1
20 TABLET, FILM COATED ORAL
COMMUNITY
Start: 2024-04-20

## 2024-06-13 NOTE — NURSING NOTE
Arnulfo was seen in [facility] today for ITP, Grave's, chronic hepB. Dr. Knapp and Dr. Denson into see patient.   Drawing plt today and anesthesia consult ordered. Patient provided with phone number to call to set up consult. No further follow up at [facility] at this time.  Please see [facility] consult note for recommendations.  Patient was discharged ambulatory and stable.

## 2024-06-13 NOTE — PROGRESS NOTES
Maternal-Fetal Medicine Consultation    Arnulfo Coker  : 1990  MRN: [id number]    REFERRAL:  Arnulfo Coker is a 34 year old sent by Dr. Merry Nuñez from [facility] clinic for MFM consultation.    HPI:  Arnulfo Coker is a 34 year old  at 32w5d by LMP consistent with 8w6d US here for MFM consultation regarding moderate thrombocytopenia.    She is here alone. She reports she had normal platelet levels at the start of her pregnancy and then recently had a level in the 90s. Her OBGYN referred her to [facility] where her platelets were 78. She was counseled that she could not receive neuraxial analgesia if her platelet level was below 140, which her OBGYN thought was irregular and thus referred her to our clinic instead. She believes her OBGYN also did a repeat platelet check that was in the 80s.      Prenatal Care:  Primary OB care this pregnancy has been with Dr. Nuñez  from [facility].    Obstetrics History:  OB History    Para Term  AB Living   2 0 0 0 1 0   SAB IAB Ectopic Multiple Live Births   1 0 0 0 0      # Outcome Date GA Lbr Ramon/2nd Weight Sex Type Anes PTL Lv   2 Current            2022               Gynecologic History:  - LMP: 10/28/23  - Last Pap: neg (2/10/21)  - Denies prior cervical surgery or procedures    Past Medical History:  No past medical history on file.    Past Surgical History:  Past Surgical History:   Procedure Laterality Date    DILATION AND CURETTAGE SUCTION WITH ULTRASOUND GUIDANCE N/A 5/3/2022    Procedure: DILATION AND CURETTAGE, UTERUS, USING SUCTION, WITH ULTRASOUND GUIDANCE;  Surgeon: Skye Suárez MD;  Location:  OR       Current Medications:  Prior to Admission medications    Not on File       Allergies:  Patient has no known allergies.    ROS:  10-point ROS negative except as in HPI     PHYSICAL EXAM:  LMP 10/28/2023      General: NAD, A&Ox3  CV: Regular rate  Pulm: Normal respiratory  "effort  Abd: Gravid  Ext: No edema      Imaging:   Please see \"Imaging\" tab under \"Chart Review\" for details of today's US at the [facility].     ASSESSMENT/PLAN:  Arnulfo Coker is a 34 year old  at 32w5d by LMP consistent with 8w6d US here for [facility] consultation regarding:     # moderate thrombocytopenia  Differential includes gestational thrombocytopenia, HELLP, ITP, HIV, or SLE. Isolated thrombocytopenia without high blood pressure, systemic symptoms, etc and with timing of previously normal platelets that then down-trended in pregnancy makes gestational thrombocytopenia most likely. Discussed that at this platelet count the biggest consideration is neuraxial analgesia options for labor and/or CS.  - last 78 per scanned oncology referral note  - repeat platelet count today  - referral for anesthesia consult regarding neuraxial anesthesia     # chronic Hep B  - undetectable viral load    # Graves disease  - q4w growth US     # placenta previa, resolved      The patient was seen and evaluated with Dr. Denson    Thank you for allowing us to participate in the care of your patient. Please do not hesitate to contact us if you have further questions regarding the management of your patient.       Valentín Knapp MD  Obstetrics & Gynecology, PGY-2  2024 10:02 AM   "

## 2024-07-09 ENCOUNTER — TRANSFERRED RECORDS (OUTPATIENT)
Dept: HEALTH INFORMATION MANAGEMENT | Facility: CLINIC | Age: 34
End: 2024-07-09
Payer: COMMERCIAL

## 2024-07-10 ENCOUNTER — TELEPHONE (OUTPATIENT)
Dept: MATERNAL FETAL MEDICINE | Facility: CLINIC | Age: 34
End: 2024-07-10
Payer: COMMERCIAL

## 2024-07-10 NOTE — TELEPHONE ENCOUNTER
Pt called stating the provider had put in an order for an anesthesia consult but no one has called her to schedule. Writer explained to patient she needs to reach out to schedule that appointment. Number provided again today. Pt also states there is a number in her mychart that she does not recognize. Phone number [phone] deleted from [employer] per her request.     Toshia Do RN on 7/10/2024 at 1:53 PM

## 2024-07-12 ENCOUNTER — TRANSCRIBE ORDERS (OUTPATIENT)
Dept: MATERNAL FETAL MEDICINE | Facility: CLINIC | Age: 34
End: 2024-07-12
Payer: COMMERCIAL

## 2024-07-12 ENCOUNTER — MEDICAL CORRESPONDENCE (OUTPATIENT)
Dept: HEALTH INFORMATION MANAGEMENT | Facility: CLINIC | Age: 34
End: 2024-07-12
Payer: COMMERCIAL

## 2024-07-12 ENCOUNTER — DOCUMENTATION ONLY (OUTPATIENT)
Dept: MATERNAL FETAL MEDICINE | Facility: CLINIC | Age: 34
End: 2024-07-12
Payer: COMMERCIAL

## 2024-07-12 DIAGNOSIS — O26.90 PREGNANCY RELATED CONDITION, ANTEPARTUM: Primary | ICD-10-CM

## 2024-07-12 NOTE — PROGRESS NOTES
Spoke with Valentín at referring clinic, informed her that patient still needs anesthesia consult for delivery and Dr. Denson recommends they discuss with delivering facility and group doing the requested ECV if they would do the procedure. Referral cancelled  Hoda Massey RN

## 2024-07-15 NOTE — TELEPHONE ENCOUNTER
FUTURE VISIT INFORMATION      SURGERY INFORMATION:  Benign gestational thrombocytopenia in third trimester     RECORDS REQUESTED FROM:       Primary Care Provider: Leila Zimmerman NP - [facility]    Most recent EKG+ Tracin22- [facility]

## 2024-07-17 ENCOUNTER — VIRTUAL VISIT (OUTPATIENT)
Dept: SURGERY | Facility: CLINIC | Age: 34
End: 2024-07-17
Attending: OBSTETRICS & GYNECOLOGY
Payer: COMMERCIAL

## 2024-07-17 ENCOUNTER — PRE VISIT (OUTPATIENT)
Dept: SURGERY | Facility: CLINIC | Age: 34
End: 2024-07-17

## 2024-07-17 ENCOUNTER — ANESTHESIA EVENT (OUTPATIENT)
Dept: SURGERY | Facility: CLINIC | Age: 34
End: 2024-07-17

## 2024-07-17 DIAGNOSIS — O99.113 BENIGN GESTATIONAL THROMBOCYTOPENIA IN THIRD TRIMESTER (H): ICD-10-CM

## 2024-07-17 DIAGNOSIS — D69.6 BENIGN GESTATIONAL THROMBOCYTOPENIA IN THIRD TRIMESTER (H): ICD-10-CM

## 2024-07-17 PROCEDURE — 99203 OFFICE O/P NEW LOW 30 MIN: CPT | Mod: 95 | Performed by: NURSE PRACTITIONER

## 2024-07-17 ASSESSMENT — PAIN SCALES - GENERAL: PAINLEVEL: NO PAIN (0)

## 2024-07-17 NOTE — H&P
See high risk OB consult note        Review of Systems  The complete review of systems is negative other than noted in the HPI or here.   Anesthesia Evaluation   Pt has had prior anesthetic.     No history of anesthetic complications       ROS/MED HX  ENT/Pulmonary:  - neg pulmonary ROS     Neurologic:  - neg neurologic ROS     Cardiovascular:  - neg cardiovascular ROS   (+)  - -   -  - -                                 No previous cardiac testing     METS/Exercise Tolerance: >4 METS Comment: Walks occasionally for exercise.  Denies exertional dyspnea or angina.    Hematologic:     (+)       history of blood transfusion, previous transfusion reaction,  - severe headache,      Musculoskeletal:  - neg musculoskeletal ROS     GI/Hepatic:     (+) GERD, Asymptomatic on medication,         hepatitis (undetectable) type B,        Renal/Genitourinary:  - neg Renal ROS     Endo:     (+)          thyroid problem, graves disease - in remission, no meds,           Psychiatric/Substance Use:  - neg psychiatric ROS     Infectious Disease: Comment: Hep B as above      Malignancy:  - neg malignancy ROS     Other: Comment: Currently pregnant  36w4d    EDC: 8/3/24         (+) Possibly pregnant, , ,

## 2024-07-17 NOTE — CONSULTS
Anesthesia Consult Note      Reason for consult:   High Risk OB consult  (O99.113,  D69.6) Benign gestational thrombocytopenia in third trimester (H24)          Date of Encounter: 2024  Referring Physician: TAMAR  Primary Care Physician:  Carlos Manuel Packerarti Coker is a 34 year old woman who is currently  who is due on 8/3/24. She is being seen in our clinic for high risk OB consult due to gestational thrombocytopenia. The patient has an OB history significant for: one prior spontaneous     OB Hx:       /parity:      History of complications of pregnancy  No previous pregnancy complications or first pregnancy    History of obstetrical surgery  - prior history of surgical uterine fibroid removal    History of bleeding/coagulopathy  -current gestational thrombocytopenia    History of anesthesia issues in patient or 1st degree relative  No previous issues with anesthesia for the patient or a first degree relative    History is obtained from the patient.     Past Medical History  No past medical history on file.    Past Surgical History  Past Surgical History:   Procedure Laterality Date    DILATION AND CURETTAGE SUCTION WITH ULTRASOUND GUIDANCE N/A 2022    Procedure: DILATION AND CURETTAGE, UTERUS, USING SUCTION, WITH ULTRASOUND GUIDANCE;  Surgeon: Skye Suárez MD;  Location: [facility] OR    UTERINE FIBROID SURGERY         Prior to Admission Medications  Current Outpatient Medications   Medication Sig Dispense Refill    famotidine (PEPCID) 20 MG tablet Take 20 mg by mouth daily at 2 pm      Prenatal Vit w/Mx-Azuopwjdl-ZL (PNV PO) Take 1 capsule by mouth daily         Menstrual history: Patient's last menstrual period was 10/28/2023.:      Allergies  No Known Allergies    Social History  Social History     Socioeconomic History    Marital status:      Spouse name: Not on file    Number of children: 0    Years of education: Not on file    Highest  education level: Not on file   Occupational History    Occupation:    Tobacco Use    Smoking status: Never     Passive exposure: Never    Smokeless tobacco: Never   Substance and Sexual Activity    Alcohol use: Never    Drug use: Never    Sexual activity: Not on file   Other Topics Concern    Not on file   Social History Narrative    Not on file     Social Determinants of Health     Financial Resource Strain: Low Risk  (4/14/2022)    Received from [facility], [facility]    Financial Resource Strain     Difficulty of Paying Living Expenses: 3     Difficulty of Paying Living Expenses: Not on file   Food Insecurity: No Food Insecurity (4/14/2022)    Received from [facility] [employer] [facility], [facility]    Food Insecurity     Worried About Running Out of Food in the Last Year: 1   Transportation Needs: No Transportation Needs (4/14/2022)    Received from [facility] [employer] [facility], [facility]    Transportation Needs     Lack of Transportation (Medical): 1   Physical Activity: Not on file   Stress: Not on file   Social Connections: Unknown (4/18/2023)    Received from [facility] [employer] [facility], [facility]    Social Connections     Frequency of Communication with Friends and Family: Not on file   Interpersonal Safety: Not on file   Housing Stability: Low Risk  (4/14/2022)    [facility], [facility]    Housing Stability     Unable to Pay for Housing in the Last Year: 1       Family History  Family History   Problem Relation Age of Onset    Anesthesia Reaction No family hx of     Thrombosis No family hx of        The complete review of systems is negative other than noted in the HPI or here. Anesthesia  "Evaluation   Pt has had prior anesthetic.     No history of anesthetic complications       ROS/MED HX  ENT/Pulmonary:  - neg pulmonary ROS     Neurologic:  - neg neurologic ROS     Cardiovascular:     (+)  - -   -  - -                                 No previous cardiac testing     METS/Exercise Tolerance: >4 METS Comment: Walks occasionally for exercise.  Denies any exertional dyspnea or angina.    Hematologic: Comments: Gestational thrombocytopenia    (+)       history of blood transfusion, previous transfusion reaction,  - severe headache,      Musculoskeletal:  - neg musculoskeletal ROS     GI/Hepatic:     (+) GERD, Asymptomatic on medication,         hepatitis (undetectable) type B,        Renal/Genitourinary:  - neg Renal ROS     Endo:     (+)          thyroid problem, graves disease \"in remission\",           Psychiatric/Substance Use:  - neg psychiatric ROS     Infectious Disease: Comment: Hep B as above.       Malignancy:  - neg malignancy ROS     Other: Comment:   EDC 8/3/24  Currently 36w4d     (+) Possibly pregnant, , ,           Virtual visit -  No vitals were obtained    Physical Exam  Constitutional: Awake, alert, cooperative, no apparent distress, and appears stated age.  Eyes: Pupils equal  HENT: Normocephalic  Respiratory: non labored breathing   Neurologic: Awake, alert, oriented to name, place and time.   Neuropsychiatric: Calm, cooperative. Normal affect.      Labs / testing: (personally reviewed)  Component      Latest Ref Rng 2024  10:53 AM   Platelet Count      150 - 450 10e3/uL 75 (L)       PERIPHERAL BLD MORPHOLOGY  Order: [id number]  Component 1 mo ago   Case Report Special Hematology Report                         Case: [id number]                                  Authorizing Provider:  Peterson Benedict MBBS        Collected:           2024 1208              Ordering Location:     [facility]            Received:            [address]              Pathologist:           " "Jim Torres MD                                                                          Specimen:    Blood                                                                                   Final Diagnosis PERIPHERAL BLOOD:  1. Mild normocytic anemia  2. Moderate thrombocytopenia  3. See comment   Electronically signed by Jim Torres MD on 6/4/2024 at 10:49 AM   Comment The morphologic features of the anemia are nonspecific. The differential includes iron deficiency, anemia of chronic disease, anemia of chronic renal insufficiency, anatomic blood loss and medication effect.    The most common cause for thrombocytopenia in pregnancy is gestational thrombocytopenia which accounts for 70% of cases of thrombocytopenia in pregnancy with a platelet count usually >70K. Other etiologies include preeclampsia (21%), immune thrombocytopenic purpura (3%) and \"other\" (6%). \"Other\" causes include systemic lupus erythematosus (SLE), antiphospholipid syndrome, connective tissue disorders, drug-induced, viral infections (including HIV), B12/folate deficiency and hypersplenism as well as other uncommon causes. In this case, there are no atypical features to suggest hemolysis, dysplasia, neoplastic or primary bone marrow disorder. Clinical correlation is recommended.    This case was also reviewed by Tatiana Solano MT, MS ([facility]).   Clinical Information The patient is a 34-year-old pregnant female.  Pertinent clinical information: Thrombocytopenic disorder and anemia.    Per EPIC: Additional history includes Graves' disease and H. pylori infection.   CBC and Differential HEMATOLOGY PARAMETERS  Tested at:  [facility] [place]           RESULTS  EXPECTED VALUES  WBC:     6.5      4.5-11x1000/cumm      RBC:     3.52     4.00-5.20 mil/cumm  DECREASED  HGB:     10.6     12-16 gm/dl         DECREASED  HCT:     33       " 33-51%                MCV:     93.8      fl           NORMOCYTIC  MCH:     30.1     26-34 pg              MCHC:    32.1     32-36 gm/dl         NORMOCHROMIC  RDW:     13.8     11.5-15.5%            PLT:     78       140-440x1000/uL     DECREASED  MPV:     12.9     6.5-11 fl           ELEVATED  Retic:   2.16     0.5-1.5%            ELEVATED    Differential                  Absolute (%)    Expected (%)                  (x10*9/L)       (x10*9/L)  Neutrophils:    4.77 (73.5)     1.7-7.0 (42-72%)      Lymphocytes:    1.07 (16.5)     0.9-2.9 (20-44%)    Monocytes:      0.42 (6.5)     <0.9 (0-11%)          Eosinophils:    0.14 (2.2)     <0.5 (0-2%)          Basophils:      0.03 (.5)      <0.3 (<3.0%)          Imm Grans:      0.06 (.9)      <0.3 (0-3%)           (Metas, Myelos,Pros)     Legend:  (L) Low    Outside records reviewed from:  Care Everywhere    Assessment    Arnulfo Coker is a 34 year old female seen as a PAC referral for risk assessment and optimization for anesthesia.    Plan/Recommendations  Pt will be optimized for the proposed procedure.  See below for details on the assessment, risk, and preoperative recommendations    NEUROLOGY  - No history of TIA, CVA or seizure    -Post Op delirium risk factors:  No risk identified    ENT  - No current airway concerns.  Will need to be reassessed day of surgery.  Mallampati: Unable to assess  TM: Unable to assess    CARDIAC  - No history of CAD, Hypertension, and Afib  - METS (Metabolic Equivalents)  Patient performs 4 or more METS exercise without symptoms             Total Score: 0      RCRI-Very low risk: Class 1 0.4% complication rate             Total Score: 0        PULMONARY    RIVER Low Risk             Total Score: 0      - Denies asthma or inhaler use  - Tobacco History    History   Smoking Status    Never   Smokeless Tobacco    Never       GI  - GERD is well controlled with pepcid  - Hep B.  undetectable  PONV Medium Risk  Total Score: 2           1  "AN PONV: Pt is Female    1 AN PONV: Patient is not a current smoker          ENDOCRINE   - BMI: Estimated body mass index is 22.71 kg/m  as calculated from the following:    Height as of 7/15/21: 1.702 m (5' 7\").    Weight as of 7/15/21: 65.8 kg (145 lb).    - graves disease - patient reports she is in \"remission\" currently and on no meds.  Following with Dr. Poon in endocr[facility]ology at [facility].     HEME  VTE Low Risk 0.26%             Total Score: 0        - noted gestational thrombocytopenia.  Prior to pregnancy platelets were WNL.  Consulted this past spring with a provider at [facility].  No current meds.  Hasn't received any platelet transfusions.  Last platelet count was 75 as above.  Discussed limitations for epidural based on current platelet count.  She notes that she plans to talk to her OB and hematologist about any options for medications to help bring up her platelet count prior to delivery as she would really like the option of having an epidural in the event that pain gets to intolerable for natural delivery.    - discussed case with Dr. Mcclure. Staff message has been sent to Dr. Corea and Dr. Armas to notify and discuss.        Addendum (24):    Received the following response from Dr. Armas regarding this patient:  Diane Armas MD Johnson, Ashley Keryn, APRN CNP; Casi Corea MD  I apologize, I'm just seeing this now.    Essentially, if her platelets are above 70k and they have been stable with known etiology and no bleeding issues in the past, most anesthesiologists would feel comfortable placing neuraxial. Epidural specifically is mostly over 70k. If she were to require  delivery, I would personally feel comfortable placing a single shot spinal with platelets above 50k, once again, in the absence of changing symptoms and known etiology, without pre-eclampia or worsening platelet values, BUT this may be different for another anesthesiologist.    I make " recommendations to patients based on the 2021 SOAP consensus statement on neuraxial procedures in obstetric patients (link below). The most important chart is on page 9 of this document, and feel free to post this within your group in PAC clinic, and I'd be happy to set aside some time to discuss with you all as well, as  I know this has come up a few times recently.    Please let me know if you have any questions.    Thank you,    Diane    Link:  [email or web address]        I also received the notes from [facility] today and was able to review Dr. Benedict's recommendations including seeing the patient back and considering prednisone dosing if needed pending repeat labs.    I called the patient today to discuss the response from Dr. Armas and also Dr. Benedict's recommendations.  She noted that she had her platelets rechecked at her private OB clinic today and the platelet level was up to 85.  She also noted that she made an appointment to follow up with Dr. Benedict next week on the 25th.      Will add patient to the high risk OB list.                 Please refer to the physical examination documented by the anesthesiologist in the anesthesia record on the day of surgery.    Video-Visit Details    Type of service:  Video Visit    Provider received verbal consent for a Video Visit from the patient? Yes       Originating Location (pt. Location): Home    Distant Location (provider location):  Off-site  Mode of Communication:  Video Conference via [employer]Well    On the day of service:     Prep time: 7 minutes  Visit time: 22 minutes  Documentation time: 13 minutes  ------------------------------------------  Total time: 42 minutes    ANTHONY Perez CNP    Preoperative Assessment Center  [facility]  Office phone: [phone]  Fax: [phone]

## 2024-07-17 NOTE — PROGRESS NOTES
Arnulfo is a 34 year old who is being evaluated via a billable video visit.    How would you like to obtain your AVS? MyChart  If the video visit is dropped, the invitation should be resent by: Text to cell phone: [phone]    Subjective   Arnulfo is a 34 year old, presenting for the following health issues:  Pre-Op Exam      HPI         Physical Exam

## 2024-08-03 ENCOUNTER — TRANSFERRED RECORDS (OUTPATIENT)
Dept: HEALTH INFORMATION MANAGEMENT | Facility: CLINIC | Age: 34
End: 2024-08-03

## 2024-08-09 ENCOUNTER — HOSPITAL ENCOUNTER (INPATIENT)
Facility: CLINIC | Age: 34
LOS: 4 days | Discharge: HOME | End: 2024-08-13
Payer: COMMERCIAL

## 2024-08-09 PROBLEM — Z34.90 ENCOUNTER FOR INDUCTION OF LABOR: Status: ACTIVE | Noted: 2024-08-09

## 2024-08-09 LAB
ABO/RH(D): NORMAL
ANTIBODY SCREEN: NEGATIVE
APTT PPP: 28 SECONDS (ref 22–38)
ERYTHROCYTE [DISTWIDTH] IN BLOOD BY AUTOMATED COUNT: 14.8 % (ref 10–15)
FIBRINOGEN PPP-MCNC: 471 MG/DL (ref 170–510)
HCT VFR BLD AUTO: 36.3 % (ref 35–47)
HGB BLD-MCNC: 12.2 G/DL (ref 11.7–15.7)
INR PPP: 1.03 (ref 0.85–1.15)
MCH RBC QN AUTO: 30.7 PG (ref 26.5–33)
MCHC RBC AUTO-ENTMCNC: 33.6 G/DL (ref 31.5–36.5)
MCV RBC AUTO: 91 FL (ref 78–100)
PLATELET # BLD AUTO: 81 10E3/UL (ref 150–450)
RBC # BLD AUTO: 3.98 10E6/UL (ref 3.8–5.2)
SPECIMEN EXPIRATION DATE: NORMAL
WBC # BLD AUTO: 6.6 10E3/UL (ref 4–11)

## 2024-08-09 PROCEDURE — 3E0DXGC INTRODUCTION OF OTHER THERAPEUTIC SUBSTANCE INTO MOUTH AND PHARYNX, EXTERNAL APPROACH: ICD-10-PCS | Performed by: OBSTETRICS & GYNECOLOGY

## 2024-08-09 PROCEDURE — 120N000001 HC R&B MED SURG/OB

## 2024-08-09 PROCEDURE — 86900 BLOOD TYPING SEROLOGIC ABO: CPT

## 2024-08-09 PROCEDURE — 250N000013 HC RX MED GY IP 250 OP 250 PS 637

## 2024-08-09 PROCEDURE — 85384 FIBRINOGEN ACTIVITY: CPT

## 2024-08-09 PROCEDURE — 85610 PROTHROMBIN TIME: CPT

## 2024-08-09 PROCEDURE — 86780 TREPONEMA PALLIDUM: CPT

## 2024-08-09 PROCEDURE — 85014 HEMATOCRIT: CPT

## 2024-08-09 PROCEDURE — 36415 COLL VENOUS BLD VENIPUNCTURE: CPT

## 2024-08-09 PROCEDURE — 85730 THROMBOPLASTIN TIME PARTIAL: CPT

## 2024-08-09 PROCEDURE — 258N000003 HC RX IP 258 OP 636

## 2024-08-09 PROCEDURE — 86923 COMPATIBILITY TEST ELECTRIC: CPT | Performed by: ADVANCED PRACTICE MIDWIFE

## 2024-08-09 RX ORDER — PENICILLIN G 3000000 [IU]/50ML
3 INJECTION, SOLUTION INTRAVENOUS EVERY 4 HOURS
OUTPATIENT
Start: 2024-08-09

## 2024-08-09 RX ORDER — METOCLOPRAMIDE 10 MG/1
10 TABLET ORAL EVERY 6 HOURS PRN
Status: DISCONTINUED | OUTPATIENT
Start: 2024-08-09 | End: 2024-08-12 | Stop reason: HOSPADM

## 2024-08-09 RX ORDER — MISOPROSTOL 200 UG/1
800 TABLET ORAL
Status: DISCONTINUED | OUTPATIENT
Start: 2024-08-09 | End: 2024-08-12 | Stop reason: HOSPADM

## 2024-08-09 RX ORDER — OXYTOCIN 10 [USP'U]/ML
10 INJECTION, SOLUTION INTRAMUSCULAR; INTRAVENOUS
Status: DISCONTINUED | OUTPATIENT
Start: 2024-08-09 | End: 2024-08-12 | Stop reason: HOSPADM

## 2024-08-09 RX ORDER — NALOXONE HYDROCHLORIDE 0.4 MG/ML
0.4 INJECTION, SOLUTION INTRAMUSCULAR; INTRAVENOUS; SUBCUTANEOUS
Status: DISCONTINUED | OUTPATIENT
Start: 2024-08-09 | End: 2024-08-12 | Stop reason: HOSPADM

## 2024-08-09 RX ORDER — PROCHLORPERAZINE 25 MG
25 SUPPOSITORY, RECTAL RECTAL EVERY 12 HOURS PRN
Status: DISCONTINUED | OUTPATIENT
Start: 2024-08-09 | End: 2024-08-12 | Stop reason: HOSPADM

## 2024-08-09 RX ORDER — KETOROLAC TROMETHAMINE 30 MG/ML
30 INJECTION, SOLUTION INTRAMUSCULAR; INTRAVENOUS
Status: COMPLETED | OUTPATIENT
Start: 2024-08-09 | End: 2024-08-11

## 2024-08-09 RX ORDER — METOCLOPRAMIDE HYDROCHLORIDE 5 MG/ML
10 INJECTION INTRAMUSCULAR; INTRAVENOUS EVERY 6 HOURS PRN
Status: DISCONTINUED | OUTPATIENT
Start: 2024-08-09 | End: 2024-08-12 | Stop reason: HOSPADM

## 2024-08-09 RX ORDER — CITRIC ACID/SODIUM CITRATE 334-500MG
30 SOLUTION, ORAL ORAL
Status: DISCONTINUED | OUTPATIENT
Start: 2024-08-09 | End: 2024-08-12 | Stop reason: HOSPADM

## 2024-08-09 RX ORDER — LIDOCAINE 40 MG/G
CREAM TOPICAL
Status: DISCONTINUED | OUTPATIENT
Start: 2024-08-09 | End: 2024-08-12 | Stop reason: HOSPADM

## 2024-08-09 RX ORDER — HYDROXYZINE HYDROCHLORIDE 25 MG/1
50 TABLET, FILM COATED ORAL
Status: DISCONTINUED | OUTPATIENT
Start: 2024-08-09 | End: 2024-08-10

## 2024-08-09 RX ORDER — TERBUTALINE SULFATE 1 MG/ML
0.25 INJECTION, SOLUTION SUBCUTANEOUS
Status: DISCONTINUED | OUTPATIENT
Start: 2024-08-09 | End: 2024-08-12 | Stop reason: HOSPADM

## 2024-08-09 RX ORDER — NALOXONE HYDROCHLORIDE 0.4 MG/ML
0.2 INJECTION, SOLUTION INTRAMUSCULAR; INTRAVENOUS; SUBCUTANEOUS
Status: DISCONTINUED | OUTPATIENT
Start: 2024-08-09 | End: 2024-08-12 | Stop reason: HOSPADM

## 2024-08-09 RX ORDER — OXYTOCIN 10 [USP'U]/ML
10 INJECTION, SOLUTION INTRAMUSCULAR; INTRAVENOUS
Status: DISCONTINUED | OUTPATIENT
Start: 2024-08-09 | End: 2024-08-13 | Stop reason: HOSPADM

## 2024-08-09 RX ORDER — PROCHLORPERAZINE MALEATE 10 MG
10 TABLET ORAL EVERY 6 HOURS PRN
Status: DISCONTINUED | OUTPATIENT
Start: 2024-08-09 | End: 2024-08-12 | Stop reason: HOSPADM

## 2024-08-09 RX ORDER — METHYLERGONOVINE MALEATE 0.2 MG/ML
200 INJECTION INTRAVENOUS
Status: DISCONTINUED | OUTPATIENT
Start: 2024-08-09 | End: 2024-08-12 | Stop reason: HOSPADM

## 2024-08-09 RX ORDER — FENTANYL CITRATE 50 UG/ML
50 INJECTION, SOLUTION INTRAMUSCULAR; INTRAVENOUS EVERY 30 MIN PRN
Status: DISCONTINUED | OUTPATIENT
Start: 2024-08-09 | End: 2024-08-12 | Stop reason: HOSPADM

## 2024-08-09 RX ORDER — LOPERAMIDE HCL 2 MG
2 CAPSULE ORAL
Status: DISCONTINUED | OUTPATIENT
Start: 2024-08-09 | End: 2024-08-12 | Stop reason: HOSPADM

## 2024-08-09 RX ORDER — OXYTOCIN/0.9 % SODIUM CHLORIDE 30/500 ML
340 PLASTIC BAG, INJECTION (ML) INTRAVENOUS CONTINUOUS PRN
Status: DISCONTINUED | OUTPATIENT
Start: 2024-08-09 | End: 2024-08-12 | Stop reason: HOSPADM

## 2024-08-09 RX ORDER — ONDANSETRON 2 MG/ML
4 INJECTION INTRAMUSCULAR; INTRAVENOUS EVERY 6 HOURS PRN
Status: DISCONTINUED | OUTPATIENT
Start: 2024-08-09 | End: 2024-08-12 | Stop reason: HOSPADM

## 2024-08-09 RX ORDER — LOPERAMIDE HCL 2 MG
4 CAPSULE ORAL
Status: DISCONTINUED | OUTPATIENT
Start: 2024-08-09 | End: 2024-08-12 | Stop reason: HOSPADM

## 2024-08-09 RX ORDER — PENICILLIN G POTASSIUM 5000000 [IU]/1
5 INJECTION, POWDER, FOR SOLUTION INTRAMUSCULAR; INTRAVENOUS ONCE
OUTPATIENT
Start: 2024-08-09 | End: 2024-08-09

## 2024-08-09 RX ORDER — TRANEXAMIC ACID 10 MG/ML
1 INJECTION, SOLUTION INTRAVENOUS EVERY 30 MIN PRN
Status: DISCONTINUED | OUTPATIENT
Start: 2024-08-09 | End: 2024-08-12 | Stop reason: HOSPADM

## 2024-08-09 RX ORDER — MISOPROSTOL 200 UG/1
400 TABLET ORAL
Status: DISCONTINUED | OUTPATIENT
Start: 2024-08-09 | End: 2024-08-12 | Stop reason: HOSPADM

## 2024-08-09 RX ORDER — IBUPROFEN 800 MG/1
800 TABLET, FILM COATED ORAL
Status: COMPLETED | OUTPATIENT
Start: 2024-08-09 | End: 2024-08-11

## 2024-08-09 RX ORDER — OXYTOCIN/0.9 % SODIUM CHLORIDE 30/500 ML
100-340 PLASTIC BAG, INJECTION (ML) INTRAVENOUS CONTINUOUS PRN
Status: DISCONTINUED | OUTPATIENT
Start: 2024-08-09 | End: 2024-08-13 | Stop reason: HOSPADM

## 2024-08-09 RX ORDER — ONDANSETRON 4 MG/1
4 TABLET, ORALLY DISINTEGRATING ORAL EVERY 6 HOURS PRN
Status: DISCONTINUED | OUTPATIENT
Start: 2024-08-09 | End: 2024-08-12 | Stop reason: HOSPADM

## 2024-08-09 RX ORDER — CARBOPROST TROMETHAMINE 250 UG/ML
250 INJECTION, SOLUTION INTRAMUSCULAR
Status: DISCONTINUED | OUTPATIENT
Start: 2024-08-09 | End: 2024-08-12 | Stop reason: HOSPADM

## 2024-08-09 RX ORDER — ACETAMINOPHEN 325 MG/1
650 TABLET ORAL EVERY 4 HOURS PRN
Status: DISCONTINUED | OUTPATIENT
Start: 2024-08-09 | End: 2024-08-12 | Stop reason: HOSPADM

## 2024-08-09 RX ADMIN — SODIUM CHLORIDE, POTASSIUM CHLORIDE, SODIUM LACTATE AND CALCIUM CHLORIDE 500 ML: 600; 310; 30; 20 INJECTION, SOLUTION INTRAVENOUS at 20:24

## 2024-08-09 RX ADMIN — HYDROXYZINE HYDROCHLORIDE 50 MG: 25 TABLET, FILM COATED ORAL at 22:56

## 2024-08-09 RX ADMIN — SODIUM CHLORIDE, POTASSIUM CHLORIDE, SODIUM LACTATE AND CALCIUM CHLORIDE 1000 ML: 600; 310; 30; 20 INJECTION, SOLUTION INTRAVENOUS at 17:08

## 2024-08-09 ASSESSMENT — ACTIVITIES OF DAILY LIVING (ADL)
ADLS_ACUITY_SCORE: 35
ADLS_ACUITY_SCORE: 18

## 2024-08-09 NOTE — PROGRESS NOTES
Phone call to CNM, updated on pt arrival and pt status. Orders to start IV fluid bolus 500-1000ml of LR per RN discretion and to perform SVE and update with results.

## 2024-08-10 LAB
ERYTHROCYTE [DISTWIDTH] IN BLOOD BY AUTOMATED COUNT: 15.2 % (ref 10–15)
HCT VFR BLD AUTO: 37 % (ref 35–47)
HGB BLD-MCNC: 12.1 G/DL (ref 11.7–15.7)
MCH RBC QN AUTO: 30.4 PG (ref 26.5–33)
MCHC RBC AUTO-ENTMCNC: 32.7 G/DL (ref 31.5–36.5)
MCV RBC AUTO: 93 FL (ref 78–100)
PLATELET # BLD AUTO: 83 10E3/UL (ref 150–450)
RBC # BLD AUTO: 3.98 10E6/UL (ref 3.8–5.2)
T PALLIDUM AB SER QL: NONREACTIVE
WBC # BLD AUTO: 5.5 10E3/UL (ref 4–11)

## 2024-08-10 PROCEDURE — 120N000001 HC R&B MED SURG/OB

## 2024-08-10 PROCEDURE — 250N000013 HC RX MED GY IP 250 OP 250 PS 637: Performed by: ADVANCED PRACTICE MIDWIFE

## 2024-08-10 PROCEDURE — 258N000003 HC RX IP 258 OP 636

## 2024-08-10 PROCEDURE — 3E033VJ INTRODUCTION OF OTHER HORMONE INTO PERIPHERAL VEIN, PERCUTANEOUS APPROACH: ICD-10-PCS | Performed by: OBSTETRICS & GYNECOLOGY

## 2024-08-10 PROCEDURE — 250N000011 HC RX IP 250 OP 636

## 2024-08-10 PROCEDURE — 36415 COLL VENOUS BLD VENIPUNCTURE: CPT

## 2024-08-10 PROCEDURE — 250N000013 HC RX MED GY IP 250 OP 250 PS 637

## 2024-08-10 PROCEDURE — 250N000009 HC RX 250: Performed by: ADVANCED PRACTICE MIDWIFE

## 2024-08-10 PROCEDURE — 85027 COMPLETE CBC AUTOMATED: CPT

## 2024-08-10 RX ORDER — SODIUM CHLORIDE, SODIUM LACTATE, POTASSIUM CHLORIDE, CALCIUM CHLORIDE 600; 310; 30; 20 MG/100ML; MG/100ML; MG/100ML; MG/100ML
INJECTION, SOLUTION INTRAVENOUS CONTINUOUS PRN
Status: DISCONTINUED | OUTPATIENT
Start: 2024-08-10 | End: 2024-08-12 | Stop reason: HOSPADM

## 2024-08-10 RX ORDER — HYDROXYZINE HYDROCHLORIDE 25 MG/1
50 TABLET, FILM COATED ORAL
Status: DISCONTINUED | OUTPATIENT
Start: 2024-08-10 | End: 2024-08-12 | Stop reason: HOSPADM

## 2024-08-10 RX ORDER — OXYTOCIN/0.9 % SODIUM CHLORIDE 30/500 ML
1-24 PLASTIC BAG, INJECTION (ML) INTRAVENOUS CONTINUOUS
Status: DISCONTINUED | OUTPATIENT
Start: 2024-08-10 | End: 2024-08-11

## 2024-08-10 RX ORDER — MISOPROSTOL 100 UG/1
25 TABLET ORAL
Status: DISCONTINUED | OUTPATIENT
Start: 2024-08-10 | End: 2024-08-12 | Stop reason: HOSPADM

## 2024-08-10 RX ORDER — TERBUTALINE SULFATE 1 MG/ML
0.25 INJECTION, SOLUTION SUBCUTANEOUS
Status: DISCONTINUED | OUTPATIENT
Start: 2024-08-10 | End: 2024-08-12 | Stop reason: HOSPADM

## 2024-08-10 RX ORDER — LIDOCAINE 40 MG/G
CREAM TOPICAL
Status: DISCONTINUED | OUTPATIENT
Start: 2024-08-10 | End: 2024-08-12 | Stop reason: HOSPADM

## 2024-08-10 RX ORDER — MORPHINE SULFATE 10 MG/ML
10 INJECTION, SOLUTION INTRAMUSCULAR; INTRAVENOUS
Status: DISCONTINUED | OUTPATIENT
Start: 2024-08-10 | End: 2024-08-12 | Stop reason: HOSPADM

## 2024-08-10 RX ADMIN — HYDROXYZINE HYDROCHLORIDE 50 MG: 25 TABLET, FILM COATED ORAL at 21:46

## 2024-08-10 RX ADMIN — SODIUM CHLORIDE, POTASSIUM CHLORIDE, SODIUM LACTATE AND CALCIUM CHLORIDE 500 ML: 600; 310; 30; 20 INJECTION, SOLUTION INTRAVENOUS at 13:01

## 2024-08-10 RX ADMIN — ACETAMINOPHEN 650 MG: 325 TABLET ORAL at 23:31

## 2024-08-10 RX ADMIN — Medication 1 MILLI-UNITS/MIN: at 17:05

## 2024-08-10 RX ADMIN — HYDROXYZINE HYDROCHLORIDE 50 MG: 25 TABLET, FILM COATED ORAL at 23:31

## 2024-08-10 RX ADMIN — FENTANYL CITRATE 50 MCG: 50 INJECTION, SOLUTION INTRAMUSCULAR; INTRAVENOUS at 17:56

## 2024-08-10 RX ADMIN — DINOPROSTONE 10 MG: 10 INSERT VAGINAL at 01:32

## 2024-08-10 ASSESSMENT — ACTIVITIES OF DAILY LIVING (ADL)
ADLS_ACUITY_SCORE: 18

## 2024-08-10 NOTE — PROGRESS NOTES
"Patient Name:  Arnulfo Coker  :      1990  MRN:      [id number]    Subjective  Arnulfo Coker is coping well with mild contractions, has been able to sleep. At this time she declined Cervidil placement by RN and states she would like to sleep for the rest of the night.     Objective  /58 (BP Location: Left arm, Patient Position: Sitting)   Pulse 79   Temp 98.3  F (36.8  C) (Oral)   Resp 16   Ht 1.702 m (5' 7\")   Wt 81.6 kg (180 lb)   LMP 10/28/2023   BMI 28.19 kg/m      FHT: baseline 145, moderate variability with periods of minimal, + accelerations, no decelerations  Uterine contractions: irritability    SVE: closed/thick/high     Assessment  35 yo  at 41w0d  Induction of labor for post-dates and non-reassuring  testing    Plan  - Arnulfo and I talked at length about non-reassuring  testing, concern about health of placenta, ability of baby to tolerate labor, and need for IOL. We discussed that because the fetal heart rate tracing has improved, my very strong recommendation is proceeding with placement of Cervidil at this time to start the induction process. I discussed concern that the longer the induction process is delayed, the more concern there is for fetal status and ability to tolerate labor, which may ultimately result in necessity of  birth, as we discussed earlier during episode of prolonged fetal tachycardia. I offered reassurance that she will be able to go back to sleep after placement of Cervidil. She consent to proceeding at this time.   - She requests minimal cervical exams- discussed importance of evaluation for next method of induction, and reassured her that cervical exams will be limited to those that are medically necessary.   -Routine support & management. Encourage position changes, rest as desired.  -Reevaluate progress with removal of Cervidil in 12 hours or sooner with a change in status.       Dr. Iraheta remains available for " consultation and collaboration as needed.    ANTHONY Frances, JEANE    Date:  8/10/2024  Time:  1:40 AM

## 2024-08-10 NOTE — H&P
HISTORY AND PHYSICAL UPDATE ADMISSION EXAM    Name: Arnulfo Coker  YOB: 1990  Medical Record Number: [id number]    History of Present Illness: Arnulfo Coker is a 34 year old  who is 40w6d pregnant and being admitted for induction of labor, indication non-reassuring fetal non-stress test and post-dates. She was seen in the clinic today and had a BPP of 4/8, off for fetal movement and tone.     Last EFW  (38w5d): EFW 55.8%, AC 72.7%    O positive  GBS positive  Passed GCT    Estimated Date of Delivery: Aug 3, 2024    EGA 40w6d    OB History    Para Term  AB Living   2 0 0 0 1 0   SAB IAB Ectopic Multiple Live Births   1 0 0 0 0      # Outcome Date GA Lbr Ramon/2nd Weight Sex Type Anes PTL Lv   2 Current            1 2022              Birth Comments: D&C        Recent Results (from the past 24 hour(s))   CBC with platelets    Collection Time: 24  5:00 PM   Result Value Ref Range    WBC Count 6.6 4.0 - 11.0 10e3/uL    RBC Count 3.98 3.80 - 5.20 10e6/uL    Hemoglobin 12.2 11.7 - 15.7 g/dL    Hematocrit 36.3 35.0 - 47.0 %    MCV 91 78 - 100 fL    MCH 30.7 26.5 - 33.0 pg    MCHC 33.6 31.5 - 36.5 g/dL    RDW 14.8 10.0 - 15.0 %    Platelet Count 81 (L) 150 - 450 10e3/uL   Adult Type and Screen    Collection Time: 24  5:00 PM   Result Value Ref Range    ABO/RH(D) O POS     Antibody Screen Negative Negative    SPECIMEN EXPIRATION DATE [id number]    INR    Collection Time: 24  8:22 PM   Result Value Ref Range    INR 1.03 0.85 - 1.15   Partial thromboplastin time    Collection Time: 24  8:22 PM   Result Value Ref Range    aPTT 28 22 - 38 Seconds   Fibrinogen activity    Collection Time: 24  8:22 PM   Result Value Ref Range    Fibrinogen Activity 471 170 - 510 mg/dL         Prenatal Complications:   Grave's disease  Followed by endocrinology and MFM, s/p tx with MMI in Sep 2023, recommended monthly TSH during pregnancy and growth US q4wks.  Hx of  "thyroid eye disease  Treated w/ prednisone burst in Feb 2023  GBSuria   Female circumcision   Chronic Hepatitis B  Followed by [facility], viral load undetectable at 28 wks  Gestational thrombocytopenia  Had consults with heme/onc and anesthesia.   Recommendation of steroid burst if persisted close to delivery: received this 8/1Platelets persistently < 100,000 since 28 wks, lowest 78 on 7/9, highest 98 on 8/6 after steroid burst.   Admission platelets 81  Unstable lie  Breech at 32 wks, cephalic at 34wks, transverse at 36 wks, has been cephalic since 37 wks  Hx of hysteroscopic myomectomy  Intracavitary fibroid in Aug 2022 and submucosal Oct 2022  History of blood transfusion  Received 2 units PRBC after D&C for SAB in 2022    Exam:      /56 (BP Location: Right arm, Patient Position: Sitting)   Pulse 79   Temp 98.4  F (36.9  C) (Oral)   Resp 18   Ht 1.702 m (5' 7\")   Wt 81.6 kg (180 lb)   LMP 10/28/2023   BMI 28.19 kg/m      On admission: baseline 160, moderate to minimal variability, no decels, no accels. Tachysystole present on admission, no pain reported with contractions, improved with 1L fluid bolus.     Patient off monitor to pray [id number]- At 1930, baseline began to raise and sustained fetal tachycardia was present 175-180 bpm- moderate variability was present, no accels or decels. Another fluid bolus was initiated, and after approximately 20 min no improvement was noted and Dr. Iraheta was consulted at 2045. Upon her arrival at 2110, FHR baseline had decreased to 155. FHR tracing reviewed by MD. Patient has no signs or symptoms of infection.    HEENT grossly normal  Neck: no lymphadenopathy or thryoidomegaly  Lungs respirations regular and unlabored  ABD gravid, non-tender  EXT:  no edema, moves freely  Vaginal exam closed/thick/high per RN  Membranes: intact    Assessment:   - Initial plan was to proceed with cervical ripening, however this was not initiated due to episode of sustained fetal " tachycardia.    Plan:   - I discussed ongoing fetal tachycardia and my concern for her baby's ability to tolerate labor contractions given non-reassuring BPP today. We discussed the potential causes of fetal tachycardia, most commonly intrauterine infection- which she currently has no symptoms of, but can also be a sign of fetal distress. We discussed my concern that causing stronger, more frequent contractions with an induction agent may lead to worsening fetal status, and that at this point, a  birth may be indicated. We reviewed that if FHR improved, initiation of induction may be an option. I updated Arnulfo that I had spoken to Dr. Iraheta who would come in to evaluate for  birth vs continued monitoring vs initiation of induction.   - Dr. Iraheta arrived on unit at . Upon her arrival, FHR baseline normalized with moderate variability and accelerations present. Arnulfo is currently having frequent mild contractions spontaneously. Discussed plan to proceed with Cervidil if FHR remains reassuring and contractions space out- at this point she is olga too frequently to start any induction agents.    Prenatal record reviewed.    Dr. Iraheta remains available for additional consultation and collaboration as needed.    ANTHONY Lai CNM    2024   9:30pm

## 2024-08-10 NOTE — PROGRESS NOTES
Came in to evaluate Arnulfo at  after she had been having persistent FHT baseline tachycardia starting around 1930 and continuing for over an hour despite IV fluid bolus and position changes.  Baseline 170-180, moderate variability, no accels, no decels. The tachycardia was present from then until about 2105hrs, when baseline settled back down to normal 150-160s with moderate variability and accels.  No decels. She had gotten up to the bathroom, and after back to bed, the FHTs were category 1 again.    Observed for a while, and FHT baseline continues to be normal, category 1 tracing. Some frequent uterine activity, with contractions q2-4minutes. Only feeling some of them as tightenings. She did have tachysystole upon admission, which spaced out after fluid flush.    Discussed the concern for the tracing with Arnulfo and her family, and then the recent improvement in the tracing. No indication to proceed with  delivery at this time; she is relieved, as she wishes to continue with labor for hopefully vaginal delivery.  Plan to continue to observe carefully.  If FHTs remain stable, will plan to proceed with cervical ripening with cervidil if contractions space further and remain spaced, so as not to cause tachysystole. If frequent spontaneous contractions continue, she may be starting some labor on her own, so would continue to observe for that.  She is in agreement with the plan.

## 2024-08-10 NOTE — PROGRESS NOTES
"Patient Name:  Arnulfo Coker  :      1990  MRN:      [id number]    Assessment:     at 41w0d  Induction of labor;post dates and non reassuring  testing in clinic  S/p 12hrs cervidil Removed at 1330  Category 1-2 FHTs  +GBS  TCP  Chronic Hep B  Graves disease    Plan:   -Discussed options for continued cervical ripening. Consents to proceeding with po cytotec. Will start once contractions space out  -Routine support & management. Encourage position changes, ambulation as appropriate, rest as desired.      Subjective:  Arnulfo Coker is coping well with contractions. Feeling a lot of discomfort in her back. Is tearful knowing she has not dilated at all since cervidil placed.     Voiding without issue. Family supportive at bedside      Objective:  BP 93/63 (BP Location: Left arm, Patient Position: Right side, Cuff Size: Adult Regular)   Pulse 79   Temp 98.7  F (37.1  C) (Oral)   Resp 16   Ht 1.702 m (5' 7\")   Wt 81.6 kg (180 lb)   LMP 10/28/2023   BMI 28.19 kg/m      FHR:Baseline: periods of cat 2; minimal variable with late and early decelerations. Resolved with position changes and IV fluid bolus Currently cat 1    Uterine contractions:  Coupling- 2 ctx then 7-10min    SVE:0 dilated/-1 to zero station      .Dr. Michelle has been updated regarding EFM and labor status. He remains available for consultation and collaboration.    Provider: Dina John CNM      Date:  8/10/2024  Time:  12:50 PM  "

## 2024-08-10 NOTE — PROGRESS NOTES
Provider notification:   Provider: Dina John CNM was notified at 1430   regarding: a persistent Category II fetal heart rate tracing for 30 minutes.    Category II Algorithm     Fetal heart rate and uterine activity reviewed with provider.    EFM interpretation suggests: absence of concern for metabolic acidemia due to: moderate variability. EFM suggests concern for interruption of the oxygen pathway due to:  late decelerations..     Interventions to improve fetal oxygenation for a Category II tracing include: maternal positioning    After discussion with provider:Plan reassessment in 30 minutes

## 2024-08-10 NOTE — PROGRESS NOTES
Provider notification:   Provider: Dina John   CNM was notified at 1830 regarding: a persistent Category II fetal heart rate tracing for 30   minutes.    Category II Algorithm     Fetal heart rate and uterine activity reviewed with provider.    EFM interpretation suggests: absence of concern for metabolic acidemia due to: moderate variability. EFM suggests concern for interruption of the oxygen pathway due to:  late decelerations..     Interventions to improve fetal oxygenation for a Category II tracing include: Category II algorithm reviewed, continue monitoring, and maternal positioning    After discussion with provider:Plan reassessment in 30 minutes

## 2024-08-10 NOTE — PROGRESS NOTES
Provider notification:   Provider: Dina COOKM was notified at 1500 regarding: a persistent Category II fetal heart rate tracing for 30 minutes.    Category II Algorithm     Fetal heart rate and uterine activity reviewed with provider.    EFM interpretation suggests: absence of concern for metabolic acidemia due to: moderate variability. EFM suggests concern for interruption of the oxygen pathway due to:  late decelerations..     Interventions to improve fetal oxygenation for a Category II tracing include: continue monitoring    After discussion with provider:Plan reassessment in 30 minutes

## 2024-08-10 NOTE — PROGRESS NOTES
Provider notification:   Provider: Dina John CNM was notified at 1300 regarding: a persistent Category II fetal heart rate tracing for 30 minutes.    Category II Algorithm     Fetal heart rate and uterine activity reviewed with provider.    EFM interpretation suggests: concern for persistent Category II tracing due to: minimal variability.  EFM suggests concern for interruption of the oxygen pathway due to:  late decelerations.     Interventions to improve fetal oxygenation for a Category II tracing include: IV fluid bolus     After discussion with provider:Provider coming to bedside and provider on unit and aware of tracing    Plan per provider / orders received for plan to remove cervadil at 1330 and reassess

## 2024-08-10 NOTE — PLAN OF CARE
Goal Outcome Evaluation:    Pt rested overnight. Vital signs stable, up ad mina. Pt olga every 2-7 minutes , contractions palpate mild. Patient states she does not feel her contractions. FHR moderate variability with accelerations, baseline 145-155 bpm. Cervidil placed at 0132 by CNM to induce labor. Position changes and IV fluid boluses overnight to maintain FHR baseline WDL. CBC will be drawn this AM.     Problem: Labor  Goal: Acceptable Pain Control  Outcome: Progressing     Problem: Labor  Goal: Effective Progression to Delivery  Outcome: Progressing     Problem: Labor  Goal: Stable Fetal Wellbeing  Outcome: Progressing  Intervention: Promote and Monitor Fetal Wellbeing  Recent Flowsheet Documentation  Taken 8/10/2024 0430 by Mei Velez, RN  Body Position: position changed independently  Taken 8/9/2024 2025 by Mei Velez, RN  Body Position: position changed independently     Mei Velez, LAUREL

## 2024-08-10 NOTE — PROGRESS NOTES
called in by CNM to talk about FHR tachycardia and potential C/S. Patient educated about causes of fetal tachycardia and potential risks. FHR returned to normal baseline at 2100. Patient olga frequently but unaware of contractions. Plan per MD and CNM is to monitor FHR, and hold off on Cervidil until contractions space out to around 3 every 10 minutes and FHR Cat 1. Plan explained to patient, questions answered.     Mei Velez RN

## 2024-08-10 NOTE — PROGRESS NOTES
Provider notification:   Provider: Dina John   CNM was notified at 1730   regarding: a persistent Category II fetal heart rate tracing for 30   minutes.    Category II Algorithm     Fetal heart rate and uterine activity reviewed with provider.    EFM interpretation suggests: concern for persistent Category II tracing due to: minimal variability.  EFM suggests concern for interruption of the oxygen pathway due to:  late decelerations.     Interventions to improve fetal oxygenation for a Category II tracing include: Category II algorithm reviewed, continue monitoring, IV fluid bolus , and maternal positioning    After discussion with provider:Plan reassessment in 30 minutes

## 2024-08-10 NOTE — PLAN OF CARE
Problem: Adult Inpatient Plan of Care  Goal: Plan of Care Review  Description: The Plan of Care Review/Shift note should be completed every shift.  The Outcome Evaluation is a brief statement about your assessment that the patient is improving, declining, or no change.  This information will be displayed automatically on your shift  note.  Outcome: Progressing   Goal Outcome Evaluation:       Cervadil removed after 12 hours by CNM, cervix per CNM exam remains closed. Will monitor FHR and contraction pattern and start PO cytotec if needed. Patient feeling tearful that cervix has not dilated despite patient feeling painful contractions. Reassurance and support given. Warm pack to back, birthing ball provided. Patient had been up to ambulate in halls.

## 2024-08-11 ENCOUNTER — ANESTHESIA EVENT (OUTPATIENT)
Dept: OBGYN | Facility: CLINIC | Age: 34
End: 2024-08-11
Payer: COMMERCIAL

## 2024-08-11 ENCOUNTER — ANESTHESIA (OUTPATIENT)
Dept: OBGYN | Facility: CLINIC | Age: 34
End: 2024-08-11
Payer: COMMERCIAL

## 2024-08-11 LAB
APTT PPP: 31 SECONDS (ref 22–38)
BLD PROD TYP BPU: NORMAL
BLD PROD TYP BPU: NORMAL
BLOOD COMPONENT TYPE: NORMAL
BLOOD COMPONENT TYPE: NORMAL
CODING SYSTEM: NORMAL
CODING SYSTEM: NORMAL
CROSSMATCH: NORMAL
CROSSMATCH: NORMAL
ERYTHROCYTE [DISTWIDTH] IN BLOOD BY AUTOMATED COUNT: 15 % (ref 10–15)
ERYTHROCYTE [DISTWIDTH] IN BLOOD BY AUTOMATED COUNT: 15 % (ref 10–15)
HCT VFR BLD AUTO: 35 % (ref 35–47)
HCT VFR BLD AUTO: 38.1 % (ref 35–47)
HGB BLD-MCNC: 11.5 G/DL (ref 11.7–15.7)
HGB BLD-MCNC: 12.7 G/DL (ref 11.7–15.7)
INR PPP: 1.04 (ref 0.85–1.15)
MCH RBC QN AUTO: 30 PG (ref 26.5–33)
MCH RBC QN AUTO: 30.3 PG (ref 26.5–33)
MCHC RBC AUTO-ENTMCNC: 32.9 G/DL (ref 31.5–36.5)
MCHC RBC AUTO-ENTMCNC: 33.3 G/DL (ref 31.5–36.5)
MCV RBC AUTO: 91 FL (ref 78–100)
MCV RBC AUTO: 91 FL (ref 78–100)
PLATELET # BLD AUTO: 86 10E3/UL (ref 150–450)
PLATELET # BLD AUTO: 87 10E3/UL (ref 150–450)
PLATELET # BLD AUTO: 91 10E3/UL (ref 150–450)
RBC # BLD AUTO: 3.83 10E6/UL (ref 3.8–5.2)
RBC # BLD AUTO: 4.19 10E6/UL (ref 3.8–5.2)
UNIT ABO/RH: NORMAL
UNIT ABO/RH: NORMAL
UNIT NUMBER: NORMAL
UNIT NUMBER: NORMAL
UNIT STATUS: NORMAL
UNIT STATUS: NORMAL
UNIT TYPE ISBT: 5100
UNIT TYPE ISBT: 5100
WBC # BLD AUTO: 5.8 10E3/UL (ref 4–11)
WBC # BLD AUTO: 6.9 10E3/UL (ref 4–11)

## 2024-08-11 PROCEDURE — 88307 TISSUE EXAM BY PATHOLOGIST: CPT | Mod: 26 | Performed by: PATHOLOGY

## 2024-08-11 PROCEDURE — 250N000009 HC RX 250: Performed by: NURSE ANESTHETIST, CERTIFIED REGISTERED

## 2024-08-11 PROCEDURE — 250N000009 HC RX 250: Performed by: ADVANCED PRACTICE MIDWIFE

## 2024-08-11 PROCEDURE — 250N000013 HC RX MED GY IP 250 OP 250 PS 637: Performed by: ADVANCED PRACTICE MIDWIFE

## 2024-08-11 PROCEDURE — 250N000011 HC RX IP 250 OP 636: Performed by: STUDENT IN AN ORGANIZED HEALTH CARE EDUCATION/TRAINING PROGRAM

## 2024-08-11 PROCEDURE — 85027 COMPLETE CBC AUTOMATED: CPT | Performed by: ADVANCED PRACTICE MIDWIFE

## 2024-08-11 PROCEDURE — 999N000157 HC STATISTIC RCP TIME EA 10 MIN

## 2024-08-11 PROCEDURE — 258N000003 HC RX IP 258 OP 636: Performed by: ADVANCED PRACTICE MIDWIFE

## 2024-08-11 PROCEDURE — 85730 THROMBOPLASTIN TIME PARTIAL: CPT | Performed by: ADVANCED PRACTICE MIDWIFE

## 2024-08-11 PROCEDURE — 88307 TISSUE EXAM BY PATHOLOGIST: CPT | Mod: TC | Performed by: OBSTETRICS & GYNECOLOGY

## 2024-08-11 PROCEDURE — 36415 COLL VENOUS BLD VENIPUNCTURE: CPT | Performed by: ADVANCED PRACTICE MIDWIFE

## 2024-08-11 PROCEDURE — 250N000011 HC RX IP 250 OP 636: Performed by: NURSE ANESTHETIST, CERTIFIED REGISTERED

## 2024-08-11 PROCEDURE — 85049 AUTOMATED PLATELET COUNT: CPT | Performed by: ADVANCED PRACTICE MIDWIFE

## 2024-08-11 PROCEDURE — 250N000011 HC RX IP 250 OP 636: Performed by: ADVANCED PRACTICE MIDWIFE

## 2024-08-11 PROCEDURE — 120N000001 HC R&B MED SURG/OB

## 2024-08-11 PROCEDURE — 85610 PROTHROMBIN TIME: CPT | Performed by: ADVANCED PRACTICE MIDWIFE

## 2024-08-11 PROCEDURE — 258N000003 HC RX IP 258 OP 636: Performed by: NURSE ANESTHETIST, CERTIFIED REGISTERED

## 2024-08-11 PROCEDURE — 999N000016 HC STATISTIC ATTENDANCE AT DELIVERY

## 2024-08-11 PROCEDURE — 999N000249 HC STATISTIC C-SECTION ON UNIT

## 2024-08-11 PROCEDURE — 250N000011 HC RX IP 250 OP 636

## 2024-08-11 PROCEDURE — 370N000017 HC ANESTHESIA TECHNICAL FEE, PER MIN: Performed by: OBSTETRICS & GYNECOLOGY

## 2024-08-11 PROCEDURE — C9290 INJ, BUPIVACAINE LIPOSOME: HCPCS | Performed by: STUDENT IN AN ORGANIZED HEALTH CARE EDUCATION/TRAINING PROGRAM

## 2024-08-11 PROCEDURE — 272N000001 HC OR GENERAL SUPPLY STERILE: Performed by: OBSTETRICS & GYNECOLOGY

## 2024-08-11 PROCEDURE — 360N000076 HC SURGERY LEVEL 3, PER MIN: Performed by: OBSTETRICS & GYNECOLOGY

## 2024-08-11 RX ORDER — TRANEXAMIC ACID 10 MG/ML
1 INJECTION, SOLUTION INTRAVENOUS EVERY 30 MIN PRN
Status: DISCONTINUED | OUTPATIENT
Start: 2024-08-11 | End: 2024-08-12 | Stop reason: HOSPADM

## 2024-08-11 RX ORDER — OXYTOCIN/0.9 % SODIUM CHLORIDE 30/500 ML
100-340 PLASTIC BAG, INJECTION (ML) INTRAVENOUS CONTINUOUS PRN
OUTPATIENT
Start: 2024-08-11

## 2024-08-11 RX ORDER — OXYTOCIN/0.9 % SODIUM CHLORIDE 30/500 ML
340 PLASTIC BAG, INJECTION (ML) INTRAVENOUS CONTINUOUS PRN
Status: DISCONTINUED | OUTPATIENT
Start: 2024-08-11 | End: 2024-08-12 | Stop reason: HOSPADM

## 2024-08-11 RX ORDER — MORPHINE SULFATE 1 MG/ML
INJECTION, SOLUTION EPIDURAL; INTRATHECAL; INTRAVENOUS PRN
Status: DISCONTINUED | OUTPATIENT
Start: 2024-08-11 | End: 2024-08-11

## 2024-08-11 RX ORDER — ONDANSETRON 2 MG/ML
INJECTION INTRAMUSCULAR; INTRAVENOUS PRN
Status: DISCONTINUED | OUTPATIENT
Start: 2024-08-11 | End: 2024-08-11

## 2024-08-11 RX ORDER — LOPERAMIDE HCL 2 MG
4 CAPSULE ORAL
Status: DISCONTINUED | OUTPATIENT
Start: 2024-08-11 | End: 2024-08-12 | Stop reason: HOSPADM

## 2024-08-11 RX ORDER — MISOPROSTOL 200 UG/1
800 TABLET ORAL
Status: DISCONTINUED | OUTPATIENT
Start: 2024-08-11 | End: 2024-08-12 | Stop reason: HOSPADM

## 2024-08-11 RX ORDER — BUPIVACAINE HYDROCHLORIDE 2.5 MG/ML
INJECTION, SOLUTION EPIDURAL; INFILTRATION; INTRACAUDAL
Status: COMPLETED | OUTPATIENT
Start: 2024-08-11 | End: 2024-08-11

## 2024-08-11 RX ORDER — FENTANYL CITRATE 50 UG/ML
INJECTION, SOLUTION INTRAMUSCULAR; INTRAVENOUS PRN
Status: DISCONTINUED | OUTPATIENT
Start: 2024-08-11 | End: 2024-08-11

## 2024-08-11 RX ORDER — OXYTOCIN/0.9 % SODIUM CHLORIDE 30/500 ML
1-8 PLASTIC BAG, INJECTION (ML) INTRAVENOUS CONTINUOUS
Status: DISCONTINUED | OUTPATIENT
Start: 2024-08-11 | End: 2024-08-12 | Stop reason: HOSPADM

## 2024-08-11 RX ORDER — CITRIC ACID/SODIUM CITRATE 334-500MG
30 SOLUTION, ORAL ORAL
Status: COMPLETED | OUTPATIENT
Start: 2024-08-11 | End: 2024-08-11

## 2024-08-11 RX ORDER — METHYLERGONOVINE MALEATE 0.2 MG/ML
200 INJECTION INTRAVENOUS
Status: DISCONTINUED | OUTPATIENT
Start: 2024-08-11 | End: 2024-08-12 | Stop reason: HOSPADM

## 2024-08-11 RX ORDER — AZITHROMYCIN 500 MG/5ML
500 INJECTION, POWDER, LYOPHILIZED, FOR SOLUTION INTRAVENOUS
Status: COMPLETED | OUTPATIENT
Start: 2024-08-11 | End: 2024-08-11

## 2024-08-11 RX ORDER — OXYTOCIN 10 [USP'U]/ML
10 INJECTION, SOLUTION INTRAMUSCULAR; INTRAVENOUS
Status: DISCONTINUED | OUTPATIENT
Start: 2024-08-11 | End: 2024-08-12 | Stop reason: HOSPADM

## 2024-08-11 RX ORDER — SODIUM CHLORIDE, SODIUM LACTATE, POTASSIUM CHLORIDE, CALCIUM CHLORIDE 600; 310; 30; 20 MG/100ML; MG/100ML; MG/100ML; MG/100ML
INJECTION, SOLUTION INTRAVENOUS CONTINUOUS
Status: DISCONTINUED | OUTPATIENT
Start: 2024-08-11 | End: 2024-08-12 | Stop reason: HOSPADM

## 2024-08-11 RX ORDER — CEFAZOLIN SODIUM 2 G/100ML
2 INJECTION, SOLUTION INTRAVENOUS SEE ADMIN INSTRUCTIONS
Status: DISCONTINUED | OUTPATIENT
Start: 2024-08-11 | End: 2024-08-12 | Stop reason: HOSPADM

## 2024-08-11 RX ORDER — ACETAMINOPHEN 325 MG/1
975 TABLET ORAL ONCE
Status: COMPLETED | OUTPATIENT
Start: 2024-08-11 | End: 2024-08-11

## 2024-08-11 RX ORDER — BUPIVACAINE HYDROCHLORIDE 7.5 MG/ML
INJECTION, SOLUTION INTRASPINAL
Status: COMPLETED | OUTPATIENT
Start: 2024-08-11 | End: 2024-08-11

## 2024-08-11 RX ORDER — CARBOPROST TROMETHAMINE 250 UG/ML
250 INJECTION, SOLUTION INTRAMUSCULAR
Status: DISCONTINUED | OUTPATIENT
Start: 2024-08-11 | End: 2024-08-12 | Stop reason: HOSPADM

## 2024-08-11 RX ORDER — OXYTOCIN 10 [USP'U]/ML
10 INJECTION, SOLUTION INTRAMUSCULAR; INTRAVENOUS
OUTPATIENT
Start: 2024-08-11

## 2024-08-11 RX ORDER — MISOPROSTOL 200 UG/1
400 TABLET ORAL
Status: DISCONTINUED | OUTPATIENT
Start: 2024-08-11 | End: 2024-08-12 | Stop reason: HOSPADM

## 2024-08-11 RX ORDER — LIDOCAINE 40 MG/G
CREAM TOPICAL
Status: DISCONTINUED | OUTPATIENT
Start: 2024-08-11 | End: 2024-08-12 | Stop reason: HOSPADM

## 2024-08-11 RX ORDER — MORPHINE SULFATE 1 MG/ML
INJECTION, SOLUTION EPIDURAL; INTRATHECAL; INTRAVENOUS
Status: COMPLETED | OUTPATIENT
Start: 2024-08-11 | End: 2024-08-11

## 2024-08-11 RX ORDER — CEFAZOLIN SODIUM 2 G/100ML
2 INJECTION, SOLUTION INTRAVENOUS
Status: COMPLETED | OUTPATIENT
Start: 2024-08-11 | End: 2024-08-11

## 2024-08-11 RX ORDER — LOPERAMIDE HCL 2 MG
2 CAPSULE ORAL
Status: DISCONTINUED | OUTPATIENT
Start: 2024-08-11 | End: 2024-08-12 | Stop reason: HOSPADM

## 2024-08-11 RX ADMIN — Medication 340 ML/HR: at 22:15

## 2024-08-11 RX ADMIN — BUPIVACAINE HYDROCHLORIDE IN DEXTROSE 1.6 ML: 7.5 INJECTION, SOLUTION SUBARACHNOID at 21:45

## 2024-08-11 RX ADMIN — Medication 500 MG: at 21:46

## 2024-08-11 RX ADMIN — ONDANSETRON 4 MG: 2 INJECTION INTRAMUSCULAR; INTRAVENOUS at 21:55

## 2024-08-11 RX ADMIN — DEXMEDETOMIDINE HYDROCHLORIDE 8 MCG: 100 INJECTION, SOLUTION INTRAVENOUS at 22:30

## 2024-08-11 RX ADMIN — FENTANYL CITRATE 50 MCG: 50 INJECTION INTRAMUSCULAR; INTRAVENOUS at 22:22

## 2024-08-11 RX ADMIN — TRANEXAMIC ACID 1 G: 10 INJECTION, SOLUTION INTRAVENOUS at 20:49

## 2024-08-11 RX ADMIN — PHENYLEPHRINE HYDROCHLORIDE 0.4 MCG/KG/MIN: 10 INJECTION INTRAVENOUS at 21:45

## 2024-08-11 RX ADMIN — Medication 1 MILLI-UNITS/MIN: at 14:07

## 2024-08-11 RX ADMIN — ACETAMINOPHEN 975 MG: 325 TABLET ORAL at 20:49

## 2024-08-11 RX ADMIN — SODIUM CHLORIDE, POTASSIUM CHLORIDE, SODIUM LACTATE AND CALCIUM CHLORIDE: 600; 310; 30; 20 INJECTION, SOLUTION INTRAVENOUS at 19:19

## 2024-08-11 RX ADMIN — BUPIVACAINE HYDROCHLORIDE 20 ML: 2.5 INJECTION, SOLUTION EPIDURAL; INFILTRATION; INTRACAUDAL at 23:05

## 2024-08-11 RX ADMIN — SODIUM CITRATE AND CITRIC ACID MONOHYDRATE 30 ML: 500; 334 SOLUTION ORAL at 20:49

## 2024-08-11 RX ADMIN — PHENYLEPHRINE HYDROCHLORIDE 100 MCG: 10 INJECTION INTRAVENOUS at 21:46

## 2024-08-11 RX ADMIN — FENTANYL CITRATE 50 MCG: 50 INJECTION INTRAMUSCULAR; INTRAVENOUS at 22:27

## 2024-08-11 RX ADMIN — MORPHINE SULFATE 0.15 MG: 1 INJECTION, SOLUTION EPIDURAL; INTRATHECAL; INTRAVENOUS at 21:45

## 2024-08-11 RX ADMIN — FENTANYL CITRATE 50 MCG: 50 INJECTION INTRAMUSCULAR; INTRAVENOUS at 22:20

## 2024-08-11 RX ADMIN — KETOROLAC TROMETHAMINE 15 MG: 30 INJECTION, SOLUTION INTRAMUSCULAR at 22:57

## 2024-08-11 RX ADMIN — CEFAZOLIN SODIUM 2 G: 2 INJECTION, SOLUTION INTRAVENOUS at 21:41

## 2024-08-11 RX ADMIN — BUPIVACAINE 20 ML: 13.3 INJECTION, SUSPENSION, LIPOSOMAL INFILTRATION at 23:05

## 2024-08-11 RX ADMIN — SODIUM CHLORIDE, POTASSIUM CHLORIDE, SODIUM LACTATE AND CALCIUM CHLORIDE: 600; 310; 30; 20 INJECTION, SOLUTION INTRAVENOUS at 07:39

## 2024-08-11 RX ADMIN — DEXMEDETOMIDINE HYDROCHLORIDE 8 MCG: 100 INJECTION, SOLUTION INTRAVENOUS at 22:23

## 2024-08-11 RX ADMIN — FENTANYL CITRATE 50 MCG: 50 INJECTION INTRAMUSCULAR; INTRAVENOUS at 22:30

## 2024-08-11 RX ADMIN — Medication 1.85 MG: at 22:18

## 2024-08-11 ASSESSMENT — ACTIVITIES OF DAILY LIVING (ADL)
ADLS_ACUITY_SCORE: 18

## 2024-08-11 NOTE — PLAN OF CARE
Goal Outcome Evaluation:      Plan of Care Reviewed With: patient        Pt was on pitocin at start of shift. Pitocin stopped. Balloon placed. Pitocin restarted at 2pm at 1 x1. Pt has been up and ambulatory in room.  supportive and attentive to patients needs. Pt did use Nitrous for balloon placement and as needed for pain. Family at bedside. Continue to readjust cook hourly and evaluate increase of pitocin not to exceed 8.

## 2024-08-11 NOTE — PROGRESS NOTES
"S: Arnulfo is resting in bed. She notes her contractions, but they are more tolerable than they were yesterday.    O: /59   Pulse 73   Temp 98.1  F (36.7  C) (Oral)   Resp 16   Ht 1.702 m (5' 7\")   Wt 81.6 kg (180 lb)   LMP 10/28/2023   BMI 28.19 kg/m      Recent Results (from the past 24 hour(s))   CBC with platelets    Collection Time: 24  6:52 AM   Result Value Ref Range    WBC Count 5.8 4.0 - 11.0 10e3/uL    RBC Count 3.83 3.80 - 5.20 10e6/uL    Hemoglobin 11.5 (L) 11.7 - 15.7 g/dL    Hematocrit 35.0 35.0 - 47.0 %    MCV 91 78 - 100 fL    MCH 30.0 26.5 - 33.0 pg    MCHC 32.9 31.5 - 36.5 g/dL    RDW 15.0 10.0 - 15.0 %    Platelet Count 86 (L) 150 - 450 10e3/uL      EFM: 150 baseline, moderate variability, +accelerations, no decelerations  [place]: q2-5 minutes, coupling noted at times  SVE: 1/40/-2/mid/moderate  Pitocin infusing at 6mU/minute; turned off    A: 34 year-old  undergoing IOL for postdates and nonreassuring  testing; GBS Positive; gestational thrombocytopenia    P:  Reviewed with Arnulfo and her partner that cervix is now dilated enough to place a ripening balloon. We reviewed R/B/A. She is in agreement. Nitrous set for pain management during placement.  Reviewed with patient indication for, risks, benefits, and alternatives of Cook's Catheter placement for cervical ripening. Informed verbal consent obtained. Patient placed in dorsal lithotomy position. Speculum placed cervix was easily visualized. Cook catheter, with stylet, inserted into the vagina and traversed through the cervix until the uterine balloon was above the level of the internal os. Stylet removed and catheter advanced until both balloons entered the cervical canal. 40mL sterile water instilled into the uterine balloon. Catheter was gently pulled back until the uterine balloon abutted the internal os. 20mL added to uterine to a total of 60mL in vaginal balloon. Patient tolerated the procedure well.      We " will restart the Pitocin at low dose at 1400 to a max of 8mU/minute.    Patient aware of timing of epidural; as platelets are adequate and anesthesia willing to place. We discussed having this placed as soon as she desires. We discussed that if platelets get lower, we may not be able to place, so it would be preferable to do it sooner rather than later. She states she will consider getting it placed this afternoon.    Dr. Michelle remains available for consultation and collaboration as needed.    ANTHONY Vizcaino CNM

## 2024-08-11 NOTE — PROVIDER NOTIFICATION
Updated CNM Dejarnett this morning of pt status overnight. Pt denies pain, contractions every 1-7 minutes, palpate mild, FHR minimal/moderate variability with accelerations. Late decelerations occurring, but not repetitive. Able to temporarily correct lates with fluids, repositioning, and emptying of bladder. Requested that after CNM handoff that the next CNM come assess the patient at bedside to help coordinate plan of care for upcoming shift. CNM agreeable and ordered a CBC with platelets to be drawn today for newest results.    Nicol Diaz RN

## 2024-08-11 NOTE — PROGRESS NOTES
Was updated by RN that tracing had periods of cat 2 after 50mcg fentanyl was given around 1800. Will continue to monitor closely. If it does not resolve, RN will notify provider

## 2024-08-11 NOTE — PROVIDER NOTIFICATION
Spoke with CNM Dina Garcia about plan of care for patient overnight. Current plan is to increase pitocin 1x1 as a ripening agent overnight. Plan is to continue pitocin instead of using misoprostol as a ripening agent overnight as the overnight surgeon Dr. Michelle declines the use of misoprostol for this patient.     Rn states that they are uncomfortable and hesitant to increase pitocin overnight when FHR strip is minimal variability with few accelerations. Per CNM to only increase pitocin up to 4milliunits maximum overnight and only increase when FHR has Cat 1 FHR tracings.    CNM plans to ripen cervix overnight with low dose pitocin up to 4milliunits, offer hydroxyzine for therapeutic rest, and keep the patient comfortable without the need for an epidural.     Nicol Diaz RN

## 2024-08-11 NOTE — PROVIDER NOTIFICATION
RN notified [facility] of pt history with low platelets in the 80s. Per [facility] John, they are not comfortable placing an epidural or spinal when the patient has had platelets history of lower than 90s. [facility] states that if the patient were to have a  that the pt would be placed under general anesthesia. [facility] John will be available overnight until 0700 if needed.     Nicol Diaz RN

## 2024-08-11 NOTE — PROGRESS NOTES
Charge RN called to discuss care plan with Dina Garcia CNM    Pt has expressed desire to have epidural pain management, and completed a pre-anesthesia consult to discuss her options, knowing that she had low platelet count.   *See note from that encounter.     As of last evening (), there had been discussion and agreement between pt, anesthesia, and provider confirming her ability to have an epidural, and/or a spinal if needed for .      As of this evening, the current Anesthesiologist has stated she will NOT perform an epidural and if the pt needs a c/s, she will go under general anesthesia.    Due to the drastic change in care and options for the pt, RN discussed how to proceed both safely, effectively, and with the pts desires in mind.  RN and CNM discussed risk/benefit of ripening Pit vs misoprostal, and if either would increase her risk of hemorrhage post delivery.   Current plan is to maintain low dose pitocin throughout the evening with hopes to gently advance her labor progress, as well as avoid need for either epidural and/or surgery.     Will reevaluate in AM and discuss again with oncoming MDA.

## 2024-08-11 NOTE — PLAN OF CARE
Problem: Labor  Goal: Effective Progression to Delivery  Outcome: Progressing   Problem: Labor  Goal: Acceptable Pain Control  Outcome: Progressing   Problem: Labor  Goal: Stable Fetal Wellbeing  Outcome: Progressing     Pt rested overnight. Vital signs stable, up ad mina. Pt olga every 1-5 minutes and are 40-70 seconds long, contractions palpate mild. FHR minimal\moderate variability with accelerations, baseline 150 bpm. Pt received pitocin to help labor progression. Pt received Hydroxyzine and Tylenol to help cope with labor.     Nicol Diaz RN

## 2024-08-12 LAB
HGB BLD-MCNC: 11.8 G/DL (ref 11.7–15.7)
PLATELET # BLD AUTO: 99 10E3/UL (ref 150–450)

## 2024-08-12 PROCEDURE — 250N000013 HC RX MED GY IP 250 OP 250 PS 637: Performed by: ADVANCED PRACTICE MIDWIFE

## 2024-08-12 PROCEDURE — 85018 HEMOGLOBIN: CPT | Performed by: OBSTETRICS & GYNECOLOGY

## 2024-08-12 PROCEDURE — 250N000011 HC RX IP 250 OP 636: Performed by: OBSTETRICS & GYNECOLOGY

## 2024-08-12 PROCEDURE — 120N000001 HC R&B MED SURG/OB

## 2024-08-12 PROCEDURE — 36415 COLL VENOUS BLD VENIPUNCTURE: CPT | Performed by: OBSTETRICS & GYNECOLOGY

## 2024-08-12 PROCEDURE — 85049 AUTOMATED PLATELET COUNT: CPT | Performed by: ADVANCED PRACTICE MIDWIFE

## 2024-08-12 PROCEDURE — 250N000013 HC RX MED GY IP 250 OP 250 PS 637: Performed by: OBSTETRICS & GYNECOLOGY

## 2024-08-12 RX ORDER — MISOPROSTOL 200 UG/1
400 TABLET ORAL
Status: DISCONTINUED | OUTPATIENT
Start: 2024-08-12 | End: 2024-08-13 | Stop reason: HOSPADM

## 2024-08-12 RX ORDER — IBUPROFEN 800 MG/1
800 TABLET, FILM COATED ORAL EVERY 6 HOURS
Status: DISCONTINUED | OUTPATIENT
Start: 2024-08-13 | End: 2024-08-13 | Stop reason: HOSPADM

## 2024-08-12 RX ORDER — NALOXONE HYDROCHLORIDE 0.4 MG/ML
0.2 INJECTION, SOLUTION INTRAMUSCULAR; INTRAVENOUS; SUBCUTANEOUS
Status: DISCONTINUED | OUTPATIENT
Start: 2024-08-12 | End: 2024-08-13 | Stop reason: HOSPADM

## 2024-08-12 RX ORDER — NALOXONE HYDROCHLORIDE 0.4 MG/ML
0.4 INJECTION, SOLUTION INTRAMUSCULAR; INTRAVENOUS; SUBCUTANEOUS
Status: DISCONTINUED | OUTPATIENT
Start: 2024-08-12 | End: 2024-08-13 | Stop reason: HOSPADM

## 2024-08-12 RX ORDER — OXYCODONE HYDROCHLORIDE 5 MG/1
5 TABLET ORAL EVERY 4 HOURS PRN
Status: DISCONTINUED | OUTPATIENT
Start: 2024-08-12 | End: 2024-08-13 | Stop reason: HOSPADM

## 2024-08-12 RX ORDER — MISOPROSTOL 200 UG/1
800 TABLET ORAL
Status: DISCONTINUED | OUTPATIENT
Start: 2024-08-12 | End: 2024-08-13 | Stop reason: HOSPADM

## 2024-08-12 RX ORDER — DEXTROSE, SODIUM CHLORIDE, SODIUM LACTATE, POTASSIUM CHLORIDE, AND CALCIUM CHLORIDE 5; .6; .31; .03; .02 G/100ML; G/100ML; G/100ML; G/100ML; G/100ML
INJECTION, SOLUTION INTRAVENOUS CONTINUOUS
Status: DISCONTINUED | OUTPATIENT
Start: 2024-08-12 | End: 2024-08-13 | Stop reason: HOSPADM

## 2024-08-12 RX ORDER — SIMETHICONE 80 MG
80 TABLET,CHEWABLE ORAL 4 TIMES DAILY PRN
Status: DISCONTINUED | OUTPATIENT
Start: 2024-08-12 | End: 2024-08-13 | Stop reason: HOSPADM

## 2024-08-12 RX ORDER — METHYLERGONOVINE MALEATE 0.2 MG/ML
200 INJECTION INTRAVENOUS
Status: DISCONTINUED | OUTPATIENT
Start: 2024-08-12 | End: 2024-08-13 | Stop reason: HOSPADM

## 2024-08-12 RX ORDER — HYDROXYZINE HYDROCHLORIDE 25 MG/1
50 TABLET, FILM COATED ORAL
Status: DISCONTINUED | OUTPATIENT
Start: 2024-08-12 | End: 2024-08-13 | Stop reason: HOSPADM

## 2024-08-12 RX ORDER — TRANEXAMIC ACID 10 MG/ML
1 INJECTION, SOLUTION INTRAVENOUS EVERY 30 MIN PRN
Status: DISCONTINUED | OUTPATIENT
Start: 2024-08-12 | End: 2024-08-13 | Stop reason: HOSPADM

## 2024-08-12 RX ORDER — MODIFIED LANOLIN
OINTMENT (GRAM) TOPICAL
Status: DISCONTINUED | OUTPATIENT
Start: 2024-08-12 | End: 2024-08-13 | Stop reason: HOSPADM

## 2024-08-12 RX ORDER — OXYTOCIN/0.9 % SODIUM CHLORIDE 30/500 ML
340 PLASTIC BAG, INJECTION (ML) INTRAVENOUS CONTINUOUS PRN
Status: DISCONTINUED | OUTPATIENT
Start: 2024-08-12 | End: 2024-08-13 | Stop reason: HOSPADM

## 2024-08-12 RX ORDER — KETOROLAC TROMETHAMINE 30 MG/ML
30 INJECTION, SOLUTION INTRAMUSCULAR; INTRAVENOUS EVERY 6 HOURS
Status: COMPLETED | OUTPATIENT
Start: 2024-08-12 | End: 2024-08-12

## 2024-08-12 RX ORDER — CARBOPROST TROMETHAMINE 250 UG/ML
250 INJECTION, SOLUTION INTRAMUSCULAR
Status: DISCONTINUED | OUTPATIENT
Start: 2024-08-12 | End: 2024-08-13 | Stop reason: HOSPADM

## 2024-08-12 RX ORDER — BISACODYL 10 MG
10 SUPPOSITORY, RECTAL RECTAL DAILY PRN
Status: DISCONTINUED | OUTPATIENT
Start: 2024-08-14 | End: 2024-08-13 | Stop reason: HOSPADM

## 2024-08-12 RX ORDER — LOPERAMIDE HCL 2 MG
4 CAPSULE ORAL
Status: DISCONTINUED | OUTPATIENT
Start: 2024-08-12 | End: 2024-08-13 | Stop reason: HOSPADM

## 2024-08-12 RX ORDER — METOCLOPRAMIDE HYDROCHLORIDE 5 MG/ML
10 INJECTION INTRAMUSCULAR; INTRAVENOUS EVERY 6 HOURS PRN
Status: DISCONTINUED | OUTPATIENT
Start: 2024-08-12 | End: 2024-08-13 | Stop reason: HOSPADM

## 2024-08-12 RX ORDER — LOPERAMIDE HCL 2 MG
2 CAPSULE ORAL
Status: DISCONTINUED | OUTPATIENT
Start: 2024-08-12 | End: 2024-08-13 | Stop reason: HOSPADM

## 2024-08-12 RX ORDER — ONDANSETRON 2 MG/ML
4 INJECTION INTRAMUSCULAR; INTRAVENOUS EVERY 6 HOURS PRN
Status: DISCONTINUED | OUTPATIENT
Start: 2024-08-12 | End: 2024-08-13 | Stop reason: HOSPADM

## 2024-08-12 RX ORDER — ONDANSETRON 4 MG/1
4 TABLET, ORALLY DISINTEGRATING ORAL EVERY 6 HOURS PRN
Status: DISCONTINUED | OUTPATIENT
Start: 2024-08-12 | End: 2024-08-13 | Stop reason: HOSPADM

## 2024-08-12 RX ORDER — SODIUM PHOSPHATE,MONO-DIBASIC 19G-7G/118
1 ENEMA (ML) RECTAL DAILY PRN
Status: DISCONTINUED | OUTPATIENT
Start: 2024-08-14 | End: 2024-08-13 | Stop reason: HOSPADM

## 2024-08-12 RX ORDER — AMOXICILLIN 250 MG
1 CAPSULE ORAL 2 TIMES DAILY
Status: DISCONTINUED | OUTPATIENT
Start: 2024-08-12 | End: 2024-08-13 | Stop reason: HOSPADM

## 2024-08-12 RX ORDER — AMOXICILLIN 250 MG
2 CAPSULE ORAL 2 TIMES DAILY
Status: DISCONTINUED | OUTPATIENT
Start: 2024-08-12 | End: 2024-08-13 | Stop reason: HOSPADM

## 2024-08-12 RX ORDER — PROCHLORPERAZINE MALEATE 10 MG
10 TABLET ORAL EVERY 6 HOURS PRN
Status: DISCONTINUED | OUTPATIENT
Start: 2024-08-12 | End: 2024-08-13 | Stop reason: HOSPADM

## 2024-08-12 RX ORDER — HYDROCORTISONE 25 MG/G
CREAM TOPICAL 3 TIMES DAILY PRN
Status: DISCONTINUED | OUTPATIENT
Start: 2024-08-12 | End: 2024-08-13 | Stop reason: HOSPADM

## 2024-08-12 RX ORDER — ACETAMINOPHEN 325 MG/1
975 TABLET ORAL EVERY 6 HOURS
Status: DISCONTINUED | OUTPATIENT
Start: 2024-08-12 | End: 2024-08-13 | Stop reason: HOSPADM

## 2024-08-12 RX ORDER — PROCHLORPERAZINE 25 MG
25 SUPPOSITORY, RECTAL RECTAL EVERY 12 HOURS PRN
Status: DISCONTINUED | OUTPATIENT
Start: 2024-08-12 | End: 2024-08-13 | Stop reason: HOSPADM

## 2024-08-12 RX ORDER — OXYTOCIN 10 [USP'U]/ML
10 INJECTION, SOLUTION INTRAMUSCULAR; INTRAVENOUS
Status: DISCONTINUED | OUTPATIENT
Start: 2024-08-12 | End: 2024-08-13 | Stop reason: HOSPADM

## 2024-08-12 RX ORDER — METOCLOPRAMIDE 10 MG/1
10 TABLET ORAL EVERY 6 HOURS PRN
Status: DISCONTINUED | OUTPATIENT
Start: 2024-08-12 | End: 2024-08-13 | Stop reason: HOSPADM

## 2024-08-12 RX ORDER — LIDOCAINE 40 MG/G
CREAM TOPICAL
Status: DISCONTINUED | OUTPATIENT
Start: 2024-08-12 | End: 2024-08-13 | Stop reason: HOSPADM

## 2024-08-12 RX ADMIN — ACETAMINOPHEN 975 MG: 325 TABLET ORAL at 09:24

## 2024-08-12 RX ADMIN — ACETAMINOPHEN 975 MG: 325 TABLET ORAL at 15:22

## 2024-08-12 RX ADMIN — HYDROXYZINE HYDROCHLORIDE 50 MG: 25 TABLET, FILM COATED ORAL at 04:19

## 2024-08-12 RX ADMIN — SENNOSIDES AND DOCUSATE SODIUM 2 TABLET: 50; 8.6 TABLET ORAL at 22:54

## 2024-08-12 RX ADMIN — KETOROLAC TROMETHAMINE 30 MG: 30 INJECTION, SOLUTION INTRAMUSCULAR at 17:45

## 2024-08-12 RX ADMIN — ACETAMINOPHEN 975 MG: 325 TABLET ORAL at 22:54

## 2024-08-12 RX ADMIN — KETOROLAC TROMETHAMINE 30 MG: 30 INJECTION, SOLUTION INTRAMUSCULAR at 10:58

## 2024-08-12 RX ADMIN — SENNOSIDES AND DOCUSATE SODIUM 2 TABLET: 50; 8.6 TABLET ORAL at 09:24

## 2024-08-12 RX ADMIN — KETOROLAC TROMETHAMINE 30 MG: 30 INJECTION, SOLUTION INTRAMUSCULAR at 04:19

## 2024-08-12 RX ADMIN — ACETAMINOPHEN 975 MG: 325 TABLET ORAL at 02:35

## 2024-08-12 ASSESSMENT — ACTIVITIES OF DAILY LIVING (ADL)
ADLS_ACUITY_SCORE: 21
ADLS_ACUITY_SCORE: 18
ADLS_ACUITY_SCORE: 21
ADLS_ACUITY_SCORE: 22
ADLS_ACUITY_SCORE: 21
ADLS_ACUITY_SCORE: 21
ADLS_ACUITY_SCORE: 20
ADLS_ACUITY_SCORE: 22
ADLS_ACUITY_SCORE: 21
ADLS_ACUITY_SCORE: 18
ADLS_ACUITY_SCORE: 20
ADLS_ACUITY_SCORE: 20
ADLS_ACUITY_SCORE: 22
ADLS_ACUITY_SCORE: 21
ADLS_ACUITY_SCORE: 20
ADLS_ACUITY_SCORE: 21

## 2024-08-12 NOTE — PLAN OF CARE
Problem: Postpartum ( Delivery)  Goal: Optimal Pain Control and Function  Outcome: Progressing   Problem: Labor  Goal: Effective Progression to Delivery  Outcome: Met     Pt delivered infant by  on  at 2215. QBL 841mL. Pt rested overnight. Vitals stable, up with standby assist, garay catheter removed at 0641. Denies preeclampsia symptoms, Fundus firm, midline, scant bleeding. Pt managing pain with Tylenol and Ibuprofen. Incision under pressure dressing, no new drainage. Bonding well with infant.    Nicol Diaz RN

## 2024-08-12 NOTE — ANESTHESIA CARE TRANSFER NOTE
Patient: Arnulfo Coker    Procedure: Procedure(s):   SECTION       Diagnosis: Fetal intolerance  Diagnosis Additional Information: No value filed.    Anesthesia Type:   Spinal     Note:    Oropharynx: oropharynx clear of all foreign objects and spontaneously breathing  Level of Consciousness: awake  Oxygen Supplementation: room air    Independent Airway: airway patency satisfactory and stable  Dentition: dentition unchanged  Vital Signs Stable: post-procedure vital signs reviewed and stable  Report to RN Given: handoff report given  Patient transferred to: Labor and Delivery    Handoff Report: Identifed the Patient, Identified the Reponsible Provider, Reviewed the pertinent medical history, Discussed the surgical course, Reviewed Intra-OP anesthesia mangement and issues during anesthesia, Set expectations for post-procedure period and Allowed opportunity for questions and acknowledgement of understanding      Vitals:  Vitals Value Taken Time   BP 97/55 249   Temp 37  C (98.6  F) 24   Pulse 61 24   Resp 18 24   SpO2 97 % 24       Electronically Signed By: ANTHONY Nicole CRNA  2024  11:20 PM

## 2024-08-12 NOTE — PROGRESS NOTES
"OB Progress Note       S: I was called to evaluate patient for  section. Arnulfo is a 34 year old  at 41w1d who presented to L&D for induction secondary to post-dates and failed BPP 4/8, 4 off for movement and tone. She had a cervidil placed for cervical ripening. She was then given PO cytotec to continue ripening. She had a Cook's Catheter placed this morning. Approximately 12 hours later, cervix remains 2-3/80/-2. FHTs baseline has risen to the 180s. Variability is minimal at times.      O: Vital signs:  Temp: 98.4  F (36.9  C) Temp src: Oral BP: 110/58     Resp: 18   O2 Device: None (Room air)   Height: 170.2 cm (5' 7\") Weight: 81.6 kg (180 lb)  Estimated body mass index is 28.19 kg/m  as calculated from the following:    Height as of this encounter: 1.702 m (5' 7\").    Weight as of this encounter: 81.6 kg (180 lb).     EFM: 180 baseline, minimal variability, fetal tachycardia noted since 184  [place]: q2-5 minutes, some coupling noted  General: sitting at bedside, comfortable   Abdomen: gravid, non tender  SVE: 2-3/80/-2 per CNM  Extremities: non tender     A: 34 year old  at 41w1d undergoing IOL for postdates and nonreassuring fetal heart rate tracing. History of gestational thrombocytopenia, + GBS, and history of hemorrhage after a D&C for incomplete . Non reassuring fetal heart tones, tachycardia with periods of minimal variability. Minimal cervical change.      P: Reviewed continued labor vs  section. Discussed risks and benefits of  section, consent signed. Informed patient Dr. Iraheta will be assisting. Will have blood on standby if needed. CBC and coags repeated. Plts 91,000. Anesthesia aware and willing to place spinal. To OR for delivery. TXA to be given en route to OR.     Isai Michelle MD        "

## 2024-08-12 NOTE — ANESTHESIA PROCEDURE NOTES
TAP Procedure Note    Pre-Procedure   Staff -        Anesthesiologist:  Aldo Cheema MD       Performed By: anesthesiologist       Location: OR       Procedure Start/Stop Times: 8/11/2024 11:05 PM and 8/11/2024 11:06 PM       Pre-Anesthestic Checklist: patient identified, IV checked, site marked, risks and benefits discussed, informed consent, monitors and equipment checked, pre-op evaluation, at physician/surgeon's request and post-op pain management  Timeout:       Correct Patient: Yes        Correct Procedure: Yes        Correct Site: Yes        Correct Position: Yes        Correct Laterality: Yes        Site Marked: Yes  Procedure Documentation  Procedure: TAP       Diagnosis: POST OPERATIVE PAIN       Laterality: bilateral       Patient Position: supine       Patient Prep/Sterile Barriers: sterile gloves, mask       Skin prep: Chloraprep       Needle Type: short bevel       Needle Gauge: 21.        Needle Length (millimeters): 110        Ultrasound guided       1. Ultrasound was used to identify targeted nerve, plexus, vascular marker, or fascial plane and place a needle adjacent to it in real-time.       2. Ultrasound was used to visualize the spread of anesthetic in close proximity to the above referenced structure.       3. A permanent image is entered into the patient's record.    Assessment/Narrative         The placement was negative for: blood aspirated, painful injection and site bleeding       Paresthesias: No.       Bolus given via needle..        Secured via.        Insertion/Infusion Method: Single Shot       Complications: none       Injection made incrementally with aspirations every 5 mL.    Medication(s) Administered   Bupivacaine 0.25% PF (Infiltration) - Infiltration   20 mL - 8/11/2024 11:05:00 PM  Bupivacaine liposome (Exparel) 1.3% LA inj susp (Infiltration) - Infiltration   20 mL - 8/11/2024 11:05:00 PM  Medication Administration Time: 8/11/2024 11:05 PM      FOR [facility]  "([facility]) ONLY:   Pain Team Contact information: please page the Pain Team Via [employer]. Search \"Pain\". During daytime hours, please page the attending first. At night please page the resident first.      "

## 2024-08-12 NOTE — ANESTHESIA PROCEDURE NOTES
"Intrathecal injection Procedure Note    Pre-Procedure   Staff -        Anesthesiologist:  Aldo Cheema MD       Performed By: anesthesiologist       Location: OB       Procedure Start/Stop Times: 8/11/2024 9:43 PM and 8/11/2024 9:45 PM       Pre-Anesthestic Checklist: patient identified, IV checked, risks and benefits discussed, informed consent, monitors and equipment checked, pre-op evaluation, at physician/surgeon's request and post-op pain management  Timeout:       Correct Patient: Yes        Correct Procedure: Yes        Correct Site: Yes        Correct Position: Yes   Procedure Documentation  Procedure: intrathecal injection       Diagnosis: C section       Patient Position: sitting       Skin prep: Chloraprep       Insertion Site: L3-4. (midline approach).       Needle Gauge: 24.        Needle Length (Inches): 3.5        Spinal Needle Type: Pencan       Introducer used       Introducer: 20 G       # of attempts: 1 and  # of redirects:     Assessment/Narrative         Paresthesias: No.       CSF fluid: clear.       Opening pressure was cmH2O while  Sitting.      Medication(s) Administered   0.75% Hyperbaric Bupivacaine (Intrathecal) - Intrathecal   1.6 mL - 8/11/2024 9:45:00 PM  Morphine PF 1 mg/mL (Intrathecal) - Intrathecal   0.15 mg - 8/11/2024 9:45:00 PM  Medication Administration Time: 8/11/2024 9:43 PM      FOR [facility] [place]   Pain Team Contact information: please page the Pain Team Via [employer]. Search \"Pain\". During daytime hours, please page the attending first. At night please page the resident first.      "

## 2024-08-12 NOTE — OP NOTE
I assisted Dr Michelle with primary low segment transverse  section for fetal intolerance, history of myomectomy x 2, and gestational thrombocytopenia platelet count 91,000.  Was able to be done under spinal anesthesia.  Delivery of 7#13oz viable female with apgars 8,8.  Tolerated procedure well. QBL 616cc.  4cm anterior intramural myoma bulging into cavity.  See Dr Michelle's note for details of surgery.

## 2024-08-12 NOTE — OP NOTE
Section Operative Note      Pre-operative Diagnosis: 1.  Intrauterine pregnancy 41 weeks 1 days gestation  2.  Post-Dates  3.  Fetal intolerance of labor  4.  Gestational thrombocytopenia  5.  +GBS  6.  Failure to progress  7.  History of myomectomy X 2    Post-operative Diagnosis: same, delivered    Procedure:  Primary low transverse  section    Surgeon:  Isai Michelle MD    Assistant: Catherine Iraheta    2nd Assistant: Nicol Coats CNM    Anesthesia:  epidural    Estimated Blood Loss:   698 mL    Complications:  None; patient tolerated the procedure well.    Specimens: Placenta    Indications for surgery:  34 year old  at 41w1d being induced for post dates and non reassuring antepartum testing with failure to progress. Discussed  section vs. continuing labor. The risks and benefits of  section were discussed and consent obtained to proceed.    Findings:  Normal fallopian tubes and ovaries. Intramural fibroid present within the anterior, left uterine wall. Viable female infant with Apgars 8 and 8 at one minute and five minutes respectively. Weight 3544 grams (7 lbs 13 oz.).     Procedure Details   The patient was taken to the Operating Room, identified as Arnulfo Coker and the procedure verified as  Delivery. A Time Out was held and the above information confirmed.    After administration of spinal anesthesia, the patient was positioned in the left lateral tilt position and was prepped and draped in the usual sterile fashion. A Pfannenstiel incision was made and carried down sharply through the subcutaneous tissue.  The fascia was incised horizontally, and the rectus abdominis muscles bluntly and sharply dissected away from the fascia superiorly and inferiorly to the pubic symphysis.  The rectus muscles were  in the midline, and the peritoneum was identified.  A vertical peritoneal incision was made and extended superiorly and inferiorly to the  upper edge of the bladder.  The vesicouterine peritoneal reflection was incised transversely and the bladder flap was bluntly dissected away from the lower uterine segment.     A horizontal incision was made in the lower uterine segment, and the incision was extended bilaterally in a curvilinear fashion in a blunt fashion.  Membranes were ruptured and the amnoitic fluid was  clear.  The infant was delivered from a ROT presentation.  There was no nuchal cord.  Mouth and nares were bulb suctioned and cord doubly clamped and cut.  The infant was passed off to the Pediatric team in attendance with findings as noted above.      The placenta was delivered spontaneously, intact, with a 3 vessel cord.  The uterine cavity was wiped free of remaining clot and membrane.  The uterus was exteriorized and the cut edges of the uterine incision were grasped.  There were no extensions of the uterine incision.  The uterus was closed with a single layer of continuous running locked 0 vicryl.  A second imbricating layer was completed with 0 vicryl. The uterine incision was inspected and all was hemostatic.  The Fallopian tubes and ovaries were examined and appeared normal.  The bladder flap was inspected and was hemostatic.  The bladder flap was reapproximated with simple interrupted 3.0 vicryl.  The abdominal cavity was irrigated. The uterus was replaced into the abdominal cavity.  The pericolic gutters were swabbed clean.  The uterine incision was once again inspected once back in its normal anatomic position and was hemostatic.  The parietal peritoneum was closed with continuous running 3.0 vicryl.  The subfascial space was inspected and hemostasis achieved with electrocautery.  The fascia was closed with continuous running 0 vicryl.  The subcutaneous tissue was irrigated and hemostasis achieved with electrocautery.  Skin edges reapproximated with subcuticular 4.0 vicryl.  The incision was dressed with Steri-Strips.    Sponge and  needle counts were correct.  There were no complications.  The patient tolerated the procedure well and was transferred to her recovery room in good condition.    Isai Michelle MD

## 2024-08-12 NOTE — ANESTHESIA PREPROCEDURE EVALUATION
Anesthesia Pre-Procedure Evaluation    Patient: Arnulfo Coker   MRN: [id number] : 1990        Procedure : Procedure(s):   SECTION          Past Medical History:   Diagnosis Date    GERD (gastroesophageal reflux disease)     Gestational thrombocytopenia (H24)     Graves' disease in remission     Hepatitis B     undetectable      Past Surgical History:   Procedure Laterality Date    DILATION AND CURETTAGE SUCTION WITH ULTRASOUND GUIDANCE N/A 2022    Procedure: DILATION AND CURETTAGE, UTERUS, USING SUCTION, WITH ULTRASOUND GUIDANCE;  Surgeon: Skye Suárez MD;  Location: [facility] OR    UTERINE FIBROID SURGERY        No Known Allergies   Social History     Tobacco Use    Smoking status: Never     Passive exposure: Never    Smokeless tobacco: Never   Substance Use Topics    Alcohol use: Never      Wt Readings from Last 1 Encounters:   24 81.6 kg (180 lb)        Anesthesia Evaluation   Pt has had prior anesthetic.     No history of anesthetic complications       ROS/MED HX  ENT/Pulmonary:  - neg pulmonary ROS     Neurologic:  - neg neurologic ROS     Cardiovascular:  - neg cardiovascular ROS     METS/Exercise Tolerance:     Hematologic:  - neg hematologic  ROS   (+)    thrombocytopenia (Gestational, plt 91),            Musculoskeletal:       GI/Hepatic:     (+) GERD,          hepatitis (Hx Hep B)         Renal/Genitourinary:       Endo:     (+)          thyroid problem,     Obesity,       Psychiatric/Substance Use:  - neg psychiatric ROS     Infectious Disease:       Malignancy:       Other:            Physical Exam    Airway        Mallampati: II   TM distance: > 3 FB   Neck ROM: full   Mouth opening: > 3 cm    Respiratory Devices and Support         Dental  no notable dental history         Cardiovascular   cardiovascular exam normal          Pulmonary   pulmonary exam normal                OUTSIDE LABS:  CBC:   Lab Results   Component Value Date    WBC 6.9 2024    WBC 5.8  "08/11/2024    HGB 12.7 08/11/2024    HGB 11.5 (L) 08/11/2024    HCT 38.1 08/11/2024    HCT 35.0 08/11/2024    PLT 91 (L) 08/11/2024    PLT 87 (L) 08/11/2024     BMP:   Lab Results   Component Value Date     05/06/2022     07/15/2021    POTASSIUM 3.5 05/06/2022    POTASSIUM 3.4 07/15/2021    CHLORIDE 106 05/06/2022    CHLORIDE 106 07/15/2021    CO2 27 05/06/2022    CO2 29 07/15/2021    BUN 5 (L) 05/06/2022    BUN 10 07/15/2021    CR 0.59 05/06/2022    CR 0.98 07/15/2021     (H) 05/06/2022    GLC 91 07/15/2021     COAGS:   Lab Results   Component Value Date    PTT 28 08/09/2024    INR 1.03 08/09/2024    FIBR 471 08/09/2024     POC:   Lab Results   Component Value Date    HCG Negative 03/12/2011     HEPATIC: No results found for: \"ALBUMIN\", \"PROTTOTAL\", \"ALT\", \"AST\", \"GGT\", \"ALKPHOS\", \"BILITOTAL\", \"BILIDIRECT\", \"MICHAEL\"  OTHER:   Lab Results   Component Value Date    ASIYA 8.7 05/06/2022       Anesthesia Plan    ASA Status:  3, emergent    NPO Status:  NPO Appropriate    Anesthesia Type: Spinal.   Induction: N/a.   Maintenance: N/A.        Consents    Anesthesia Plan(s) and associated risks, benefits, and realistic alternatives discussed. Questions answered and patient/representative(s) expressed understanding.     - Discussed: Risks, Benefits and Alternatives for BOTH SEDATION and the PROCEDURE were discussed     - Discussed with:  Patient            Postoperative Care    Pain management: IV analgesics, Oral pain medications, intrathecal morphine, Neuraxial analgesia, Peripheral nerve block (Single Shot), Multi-modal analgesia.   PONV prophylaxis: Ondansetron (or other 5HT-3), Dexamethasone or Solumedrol     Comments:    Other Comments: Spinal w/ 150 mcg intrathecal morphine.  Zofran 4mg after placement of spinal.  Decadron 4mg after delivery.  Toradol 15 mg at EOC if no ureteral injury or bleeding concerns.  TAP block at EOC.    Hx of hemorrhage following D&C. Gestational thrombocytopenia stable w/ " plt 91. T&C, 2 units avaiable. Plan 1g TXA at start of case. 2 PIV.       neg OB ROS.      Aldo Cheema MD    I have reviewed the pertinent notes and labs in the chart from the past 30 days and (re)examined the patient.  Any updates or changes from those notes are reflected in this note.

## 2024-08-12 NOTE — PROGRESS NOTES
" Postpartum Day 1    Patient Name:  Arnulfo Coker  :  1990  MRN:  [id number]      Assessment:  Normal postpartum course.    Plan:  Continue current care. Increase mobilization. Monitor first voids per protocol.     Subjective:  The patient feels tired. Julian removed this AM, no void yet. Lochia normal, tolerating normal diet, ambulating without difficulty. No flatus yet. Pain is well controlled with current medications.  The patient has no emotional concerns.  The baby is well and rooming in.     YOB: 2022   Birth Time: 10:15 PM     Prenatal complications:  Grave's disease  Followed by endocrinology and MFM, s/p tx with MMI in Sep 2023, recommended monthly TSH during pregnancy and growth US q4wks.  Hx of thyroid eye disease  Treated w/ prednisone burst in 2023  GBSuria   Female circumcision   Chronic Hepatitis B  Followed by [facility], viral load undetectable at 28 wks  Gestational thrombocytopenia  Had consults with heme/onc and anesthesia.   Recommendation of steroid burst if persisted close to delivery: received this latelets persistently < 100,000 since 28 wks, lowest 78 on , highest 98 on  after steroid burst.   Admission platelets 81  Unstable lie  Breech at 32 wks, cephalic at 34wks, transverse at 36 wks, has been cephalic since 37 wks  Hx of hysteroscopic myomectomy  Intracavitary fibroid in Aug 2022 and submucosal Oct 2022  History of blood transfusion  Received 2 units PRBC after D&C for SAB in     Objective:  /60 (BP Location: Right arm, Patient Position: Sitting, Cuff Size: Adult Regular)   Pulse 68   Temp 98.2  F (36.8  C) (Oral)   Resp 15   Ht 1.702 m (5' 7\")   Wt 81.6 kg (180 lb)   LMP 10/28/2023   SpO2 97%   Breastfeeding Unknown   BMI 28.19 kg/m    Patient Vitals for the past 24 hrs:   BP Temp Temp src Pulse Resp SpO2   24 0930 108/60 98.2  F (36.8  C) Oral 68 15 97 %   24 0545 101/53 98.5  F (36.9  C) Oral -- 14 --   24 " 0425 110/62 -- -- -- -- --   08/12/24 0330 90/54 -- -- -- -- --   08/12/24 0230 98/56 -- -- -- -- --   08/12/24 0130 99/59 98.4  F (36.9  C) Oral -- 16 --   08/12/24 0100 101/64 -- -- -- -- --   08/12/24 0030 104/69 -- -- -- -- --   08/12/24 0015 109/65 -- -- -- -- --   08/12/24 0000 100/59 -- -- -- -- --   08/11/24 2345 103/60 98.6  F (37  C) Oral -- 16 --   08/11/24 2330 101/55 -- -- -- -- --   08/11/24 2319 97/55 98.6  F (37  C) Oral 61 18 97 %   08/11/24 1958 110/58 98.4  F (36.9  C) Oral -- 18 --   08/11/24 1536 126/57 98.3  F (36.8  C) Oral -- 16 --       Exam: Patient A&O x 3. No acute distress, breathing unlabored. The amount and color of the lochia is appropriate for the duration of recovery. Urinary output is adequate. The low transverse surgical dressing is clean, dry and intact. OK to remove pressure dressing this evening.     Lab Results   Component Value Date    AS Negative 08/09/2024    HGB 11.8 08/12/2024       Immunization History   Administered Date(s) Administered    COVID-19 MONOVALENT 12+ (Pfizer) 08/26/2021, 11/02/2021       Provider: ANTHONY Mcdowell CNM    Date:  8/12/2024  Time:  9:32 AM

## 2024-08-12 NOTE — PROGRESS NOTES
Attendance to delivery. CPAP delivered via T-Piece. Patient eventually on RA with stable vitals. RT excused.

## 2024-08-12 NOTE — ANESTHESIA POSTPROCEDURE EVALUATION
Patient: Arnulfo Coker    Procedure: Procedure(s):   SECTION       Anesthesia Type:  Spinal    Note:  Disposition: Inpatient   Postop Pain Control: Uneventful            Sign Out: Well controlled pain   PONV: No   Neuro/Psych: Uneventful            Sign Out: Acceptable/Baseline neuro status   Airway/Respiratory: Uneventful            Sign Out: Acceptable/Baseline resp. status   CV/Hemodynamics: Uneventful            Sign Out: Acceptable CV status; No obvious hypovolemia; No obvious fluid overload   Other NRE: NONE   DID A NON-ROUTINE EVENT OCCUR? No       Last vitals:  Vitals:    24 0425 24 0545 24 0930   BP: 110/62 101/53 108/60   Pulse:   68   Resp:  14 15   Temp:  36.9  C (98.5  F) 36.8  C (98.2  F)   SpO2:   97%       Electronically Signed By: Jonel Jaimes MD  2024  10:38 AM

## 2024-08-12 NOTE — PROGRESS NOTES
"S: Arnulfo is resting in bed visiting with family. She notes her contractions, but they are not very painful yet.    O: /58 (BP Location: Left arm, Patient Position: Semi-Marshall's, Cuff Size: Adult Regular)   Pulse 73   Temp 98.4  F (36.9  C) (Oral)   Resp 18   Ht 1.702 m (5' 7\")   Wt 81.6 kg (180 lb)   LMP 10/28/2023   BMI 28.19 kg/m    EFM: 180 baseline, minimal variability, fetal tachycardia noted since   [place]: q2-5 minutes, some coupling noted  SVE: 2-3/80/-2    A: 34 year old  at 41w1d undergoing IOL for postdates and nonreassuring fetal heart rate tracing    P:   Reviewed with Arnulfo and her family my concern for continuing IOL given fetal heart rate has been quite high for the past hour and half. More concerning is the loss of variability. I do not think fetus will continue sustained attempts for active labor. Discussed recommendation for  section. After questions answered and Arnulfo given time to discuss with her family; she is in agreement. We reviewed risks and she would like to proceed. Dr. Michelle notified.  CBC and Coags ordered. 2 Units RBC placed on hold. TXA en route to OR. Preop orders placed.  Dr. Michelle will assume care of the patient.     ANTHONY Vizcaino CNM   "

## 2024-08-12 NOTE — PLAN OF CARE
Problem: Postpartum ( Delivery)  Goal: Fluid and Electrolyte Balance  Outcome: Progressing  Problem: Postpartum ( Delivery)  Goal: Absence of Infection Signs and Symptoms  Outcome: Progressing  Problem: Postpartum ( Delivery)  Goal: Anesthesia/Sedation Recovery  Outcome: Progressing  Vitally stable. Pain managed with Tylenol and Toradol. Fundus firm. Breast and bottle feeding. Voided in hat x1. Needs one more recorded void. Up ad mina. Continue plan of care.

## 2024-08-13 VITALS
WEIGHT: 180 LBS | BODY MASS INDEX: 28.25 KG/M2 | HEIGHT: 67 IN | DIASTOLIC BLOOD PRESSURE: 73 MMHG | SYSTOLIC BLOOD PRESSURE: 105 MMHG | RESPIRATION RATE: 16 BRPM | OXYGEN SATURATION: 99 % | TEMPERATURE: 98.3 F | HEART RATE: 75 BPM

## 2024-08-13 LAB
PATH REPORT.COMMENTS IMP SPEC: NORMAL
PATH REPORT.COMMENTS IMP SPEC: NORMAL
PATH REPORT.FINAL DX SPEC: NORMAL
PATH REPORT.GROSS SPEC: NORMAL
PATH REPORT.MICROSCOPIC SPEC OTHER STN: NORMAL
PATH REPORT.RELEVANT HX SPEC: NORMAL
PHOTO IMAGE: NORMAL

## 2024-08-13 PROCEDURE — 250N000013 HC RX MED GY IP 250 OP 250 PS 637: Performed by: OBSTETRICS & GYNECOLOGY

## 2024-08-13 RX ORDER — IBUPROFEN 800 MG/1
800 TABLET, FILM COATED ORAL EVERY 6 HOURS
Qty: 60 TABLET | Refills: 0 | Status: SHIPPED | OUTPATIENT
Start: 2024-08-13

## 2024-08-13 RX ORDER — HYDROXYZINE HYDROCHLORIDE 25 MG/1
50 TABLET, FILM COATED ORAL ONCE
Status: COMPLETED | OUTPATIENT
Start: 2024-08-13 | End: 2024-08-13

## 2024-08-13 RX ORDER — ACETAMINOPHEN 325 MG/1
975 TABLET ORAL EVERY 6 HOURS
Qty: 60 TABLET | Refills: 0 | Status: SHIPPED | OUTPATIENT
Start: 2024-08-13

## 2024-08-13 RX ORDER — AMOXICILLIN 250 MG
1 CAPSULE ORAL 2 TIMES DAILY
Qty: 30 TABLET | Refills: 0 | Status: SHIPPED | OUTPATIENT
Start: 2024-08-13

## 2024-08-13 RX ORDER — OXYCODONE HYDROCHLORIDE 5 MG/1
5 TABLET ORAL EVERY 4 HOURS PRN
Qty: 12 TABLET | Refills: 0 | Status: SHIPPED | OUTPATIENT
Start: 2024-08-13

## 2024-08-13 RX ADMIN — IBUPROFEN 800 MG: 800 TABLET, FILM COATED ORAL at 12:32

## 2024-08-13 RX ADMIN — ACETAMINOPHEN 975 MG: 325 TABLET ORAL at 04:14

## 2024-08-13 RX ADMIN — ACETAMINOPHEN 975 MG: 325 TABLET ORAL at 16:53

## 2024-08-13 RX ADMIN — IBUPROFEN 800 MG: 800 TABLET, FILM COATED ORAL at 00:17

## 2024-08-13 RX ADMIN — ACETAMINOPHEN 975 MG: 325 TABLET ORAL at 10:23

## 2024-08-13 RX ADMIN — SENNOSIDES AND DOCUSATE SODIUM 1 TABLET: 50; 8.6 TABLET ORAL at 10:23

## 2024-08-13 RX ADMIN — IBUPROFEN 800 MG: 800 TABLET, FILM COATED ORAL at 06:26

## 2024-08-13 ASSESSMENT — ACTIVITIES OF DAILY LIVING (ADL)
ADLS_ACUITY_SCORE: 20
ADLS_ACUITY_SCORE: 21
ADLS_ACUITY_SCORE: 20
ADLS_ACUITY_SCORE: 21
ADLS_ACUITY_SCORE: 20
ADLS_ACUITY_SCORE: 21
ADLS_ACUITY_SCORE: 20
ADLS_ACUITY_SCORE: 21
ADLS_ACUITY_SCORE: 20

## 2024-08-13 NOTE — PLAN OF CARE
Problem: Adult Inpatient Plan of Care  Goal: Optimal Comfort and Wellbeing  Outcome: Progressing  Intervention: Provide Person-Centered Care  Recent Flowsheet Documentation  Taken 2024 0414 by Molly Silva RN  Trust Relationship/Rapport:   care explained   choices provided   emotional support provided   empathic listening provided   questions answered   questions encouraged   reassurance provided   thoughts/feelings acknowledged  Taken 20245 by Molly Silva RN  Trust Relationship/Rapport:   care explained   choices provided   emotional support provided   empathic listening provided   questions answered   questions encouraged   reassurance provided   thoughts/feelings acknowledged     Problem: Postpartum ( Delivery)  Goal: Successful Parent Role Transition  Outcome: Progressing  Goal: Anesthesia/Sedation Recovery  Outcome: Progressing  Goal: Optimal Pain Control and Function  Outcome: Progressing  Goal: Effective Urinary Elimination  Outcome: Progressing   Goal Outcome Evaluation:                    Pt is resting comfortably in bed. FOB and  are at bedside. VSS. Fundas is firm with scant bleeding. FOB has been assisting mom in cares. Mom tried pumping last night and was able to get a few drops of colostrum. Pt has been up and moving around her room. Pt is able to maintain her pain control with motrin and tylenol.     Molly Silva Rn

## 2024-08-13 NOTE — LACTATION NOTE
"This note was copied from a baby's chart.  Lactation visit for mom Arnulfo and two day old term  girl Anahi delivered via C/section.  Mom is Hep B and GBS positive.  Graves disease in remission.  First baby for mom.  Plans on breastfeeding but so far baby has been bottle feeding formula.    Reviewed with family the importance of breastfeeding attempts and skin to skin to help with milk production.  Mom had been set up with \[facility]\"" Medela Symphony pump and has used a few times.      Baby eager to eat so assisted with positioning and breastfeeding in cross cradle hold.  Taught mom nipple to nose and \"nipple sandwich\" technique to help shape nipple.  Baby latched and sucked well on both sides.  Latch was comfortable for mom.  Showed how to compress breast to keep baby engaged and sucking.  Encouraged a minimum of 8 feeding attempts every 24 hours.  If offering baby a bottle, then good to pump.  Could also do some hand expression and offer baby any EBM.    Reviewed breastfeeding section of patient education booklet and pointed out handout with QR codes on latch, hand expression, positions, how to choose flange size, etc.      Mom choosing to purchase Medela Pump N Style through hospital.  Assisted with pump assembly and showed mom how to use with 21 mm size flange.        "

## 2024-08-13 NOTE — DISCHARGE SUMMARY
OB Discharge Summary      Date:  2024    Name:  Arnulfo Coker  :  1990  MRN:  [id number]      Admission Date:  2024  Delivery Date: 2024   Gestational Age at Delivery:  41w1d  Discharge Date:  2024    Principal Diagnosis:    Patient Active Problem List   Diagnosis    Incomplete     Anemia, unspecified type    Retained products of conception after miscarriage    Encounter for induction of labor         Conditions complicating Pregnancy: Grave's disease  Followed by endocrinology and MFM, s/p tx with MMI in Sep 2023, recommended monthly TSH during pregnancy and growth US q4wks.  Hx of thyroid eye disease  Treated w/ prednisone burst in 2023  GBSuria   Female circumcision   Chronic Hepatitis B  Followed by [facility], viral load undetectable at 28 wks  Gestational thrombocytopenia  Had consults with heme/onc and anesthesia.   Recommendation of steroid burst if persisted close to delivery: received this latelets persistently < 100,000 since 28 wks, lowest 78 on , highest 98 on  after steroid burst.   Admission platelets 81  Unstable lie  Breech at 32 wks, cephalic at 34wks, transverse at 36 wks, has been cephalic since 37 wks  Hx of hysteroscopic myomectomy  Intracavitary fibroid in Aug 2022 and submucosal Oct 2022  History of blood transfusion  Received 2 units PRBC after D&C for SAB in     Procedure(s) Performed:  LTCS    Indication for :  failure to progress  Indication for Induction:  post dates     Condition at Discharge:  stable    Discharge Medications:      Review of your medicines        START taking        Dose / Directions   acetaminophen 325 MG tablet  Commonly known as: TYLENOL  Used for: Postpartum care and examination of lactating mother      Dose: 975 mg  Take 3 tablets (975 mg) by mouth every 6 hours  Quantity: 60 tablet  Refills: 0     ibuprofen 800 MG tablet  Commonly known as: ADVIL/MOTRIN  Used for: Postpartum care and examination of  lactating mother      Dose: 800 mg  Take 1 tablet (800 mg) by mouth every 6 hours  Quantity: 60 tablet  Refills: 0     oxyCODONE 5 MG tablet  Commonly known as: ROXICODONE  Used for: Postpartum care and examination of lactating mother      Dose: 5 mg  Take 1 tablet (5 mg) by mouth every 4 hours as needed for moderate pain  Quantity: 12 tablet  Refills: 0     senna-docusate 8.6-50 MG tablet  Commonly known as: SENOKOT-S/PERICOLACE  Used for: Postpartum care and examination of lactating mother      Dose: 1 tablet  Take 1 tablet by mouth 2 times daily  Quantity: 30 tablet  Refills: 0            CONTINUE these medicines which have NOT CHANGED        Dose / Directions   famotidine 20 MG tablet  Commonly known as: PEPCID      Dose: 20 mg  Take 20 mg by mouth daily at 2 pm  Refills: 0     PNV PO      Dose: 1 capsule  Take 1 capsule by mouth daily  Refills: 0               Where to get your medicines        These medications were sent to [id number] - [place] [address] AT [facility] [address] [place] [address], [facility] [address]      Hours: 24-hours Phone: [phone]   acetaminophen 325 MG tablet  ibuprofen 800 MG tablet  senna-docusate 8.6-50 MG tablet       Some of these will need a paper prescription and others can be bought over the counter. Ask your nurse if you have questions.    Bring a paper prescription for each of these medications  oxyCODONE 5 MG tablet          Discharge Plan:    Follow up with /JEANE:  at 2 and 6 weeks PP with [facility] [place]   Patient Instructions:      Physical activity: no driving on narcotics, no lifting over 15 lb for 6 weeks    Diet:  regular    Medication: see above    Other:        Physician/CNM: Maggy Bejarano CNM    Name:  Ernstkaty KATI Ascencione  :  1990  MRN:  [id number]

## 2024-08-13 NOTE — PROGRESS NOTES
" Postpartum Day 2    Patient Name:  Arnulfo Coker  :  1990  MRN:  [id number]      Assessment:  Normal postpartum course.    Plan:  Continue current care.  Discharge home today per patient preference.      Subjective:  The patient feels well:  Voiding without difficulty, lochia normal, tolerating normal diet, and passing flatus.  Pain is well controlled with current medications.  The patient has no emotional concerns.  The baby is well.    YOB: 2022     Birth Time: 10:15 PM     Prenatal Complications: Grave's disease  Followed by endocrinology and MFM, s/p tx with MMI in Sep 2023, recommended monthly TSH during pregnancy and growth US q4wks.  Hx of thyroid eye disease  Treated w/ prednisone burst in 2023  GBSuria   Female circumcision   Chronic Hepatitis B  Followed by [facility], viral load undetectable at 28 wks  Gestational thrombocytopenia  Had consults with heme/onc and anesthesia.   Recommendation of steroid burst if persisted close to delivery: received this latelets persistently < 100,000 since 28 wks, lowest 78 on , highest 98 on  after steroid burst.   Admission platelets 81  Unstable lie  Breech at 32 wks, cephalic at 34wks, transverse at 36 wks, has been cephalic since 37 wks  Hx of hysteroscopic myomectomy  Intracavitary fibroid in Aug 2022 and submucosal Oct 2022  History of blood transfusion  Received 2 units PRBC after D&C for SAB in     Objective:  BP 99/60   Pulse 72   Temp 98.1  F (36.7  C) (Oral)   Resp 18   Ht 1.702 m (5' 7\")   Wt 81.6 kg (180 lb)   LMP 10/28/2023   SpO2 99%   Breastfeeding Unknown   BMI 28.19 kg/m    Patient Vitals for the past 24 hrs:   BP Temp Temp src Pulse Resp SpO2   24 0414 99/60 98.1  F (36.7  C) Oral 72 18 99 %   24 2130 104/70 98.4  F (36.9  C) Oral 85 18 --   24 1745 103/69 97.8  F (36.6  C) Oral 73 14 --   24 1325 110/65 98.1  F (36.7  C) Oral 70 16 99 %       Exam: Patient A&O x 3. No acute " distress, breathing unlabored. The amount and color of the lochia is appropriate for the duration of recovery. Uterine fundus is firm at U-1. Urinary output is adequate. The uncovered low transverse incision is healing well.  The patient is ambulating well without assistance.  The patient is tolerating a regular diet.    Lab Results   Component Value Date    AS Negative 08/09/2024    HGB 11.8 08/12/2024       Immunization History   Administered Date(s) Administered    COVID-19 MONOVALENT 12+ (Pfizer) 08/26/2021, 11/02/2021         Provider:  Maggy Bejarano CNM    Date:  8/13/2024  Time:  10:16 AM

## 2024-08-13 NOTE — PROGRESS NOTES
Arnulfo Coker  [id number]  1990    Discharge follow-up plan discussed with patient, needs assessed. Patient requests follow-up phone call after discharge, declines home care visit. Phone number is correct. No additional needs identified at this time.

## 2024-08-13 NOTE — PLAN OF CARE
Patient discharged home around 1930 with significant other and infant. Discharge education completed and AVS printed and reviewed. All personal belongings were returned to patient. Patient verbalized and demonstrated understanding and had no further questions or concerns.

## 2024-08-13 NOTE — DISCHARGE INSTRUCTIONS
Warning Signs after Having a Baby    Keep this paper on your fridge or somewhere else where you can see it.    Call your provider if you have any of these symptoms up to 12 weeks after having your baby.    Thoughts of hurting yourself or your baby  Pain in your chest or trouble breathing  Severe headache not helped by pain medicine  Eyesight concerns (blurry vision, seeing spots or flashes of light, other changes to eyesight)  Fainting, shaking or other signs of a seizure    Call 9-1-1 if you feel that it is an emergency.     The symptoms below can happen to anyone after giving birth. They can be very serious. Call your provider if you have any of these warning signs.    My provider s phone number: _______________________    Losing too much blood (hemorrhage)    Call your provider if you soak through a pad in less than an hour or pass blood clots bigger than a golf ball. These may be signs that you are bleeding too much.    Blood clots in the legs or lungs    After you give birth, your body naturally clots its blood to help prevent blood loss. Sometimes this increased clotting can happen in other areas of the body, like the legs or lungs. This can block your blood flow and be very dangerous.     Call your provider if you:  Have a red, swollen spot on the back of your leg that is warm or painful when you touch it.   Are coughing up blood.     Infection    Call your provider if you have any of these symptoms:  Fever of 100.4 F (38 C) or higher.  Pain or redness around your stitches if you had an incision.   Any yellow, white, or green fluid coming from places where you had stitches or surgery.    Mood Problems (postpartum depression)    Many people feel sad or have mood changes after having a baby. But for some people, these mood swings are worse.     Call your provider right away if you feel so anxious or nervous that you can't care for yourself or your baby.    Preeclampsia (high blood pressure)    Even if you  didn't have high blood pressure when you were pregnant, you are at risk for the high blood pressure disease called preeclampsia. This risk can last up to 12 weeks after giving birth.     Call your provider if you have:   Pain on your right side under your rib cage  Sudden swelling in the hands and face    Remember: You know your body. If something doesn't feel right, get medical help.     For informational purposes only. Not to replace the advice of your health care provider. Copyright 2020 [facility]. All rights reserved. Clinically reviewed by Cathleen Rivas, RNC-OB, MSN. Accurence 555436 - Rev .       Section: What to Expect at Home  Your Recovery     A  section, or , is surgery to deliver your baby through a cut that the doctor makes in your lower belly and uterus. The cut is called an incision.  You may have some pain in your lower belly and need pain medicine for 1 to 2 weeks. You can expect some vaginal bleeding for several weeks. You will probably need about 6 weeks to fully recover.  It's important to take it easy while the incision heals. Avoid heavy lifting, strenuous activities, and exercises that strain the belly muscles while you recover. Ask a family member or friend for help with housework, cooking, and shopping.  This care sheet gives you a general idea about how long it will take for you to recover. But each person recovers at a different pace. Follow the steps below to get better as quickly as possible.  How can you care for yourself at home?  Activity    Rest when you feel tired. Getting enough sleep will help you recover.     Try to walk each day. Start by walking a little more than you did the day before. Bit by bit, increase the amount you walk. Walking boosts blood flow and helps prevent pneumonia, constipation, and blood clots.     Avoid strenuous activities, such as bicycle riding, jogging, weightlifting, and aerobic exercise, for 6 weeks or  until your doctor says it is okay.     Until your doctor says it is okay, do not lift anything heavier than your baby.     Do not do sit-ups or other exercises that strain the belly muscles for 6 weeks or until your doctor says it is okay.     Hold a pillow over your incision when you cough or take deep breaths. This will support your belly and decrease your pain.     You may shower as usual. Pat the incision dry when you are done.     You will have some vaginal bleeding. Wear sanitary pads. Do not douche or use tampons until your doctor says it is okay.     Ask your doctor when you can drive again.     You will probably need to take at least 6 weeks off work. It depends on the type of work you do and how you feel.     Ask your doctor when it is okay for you to have sex.   Diet    You can eat your normal diet. If your stomach is upset, try bland, low-fat foods like plain rice, broiled chicken, toast, and yogurt.     Drink plenty of fluids (unless your doctor tells you not to).     You may notice that your bowel movements are not regular right after your surgery. This is common. Try to avoid constipation and straining with bowel movements. You may want to take a fiber supplement every day. If you have not had a bowel movement after a couple of days, ask your doctor about taking a mild laxative.     If you are breastfeeding, limit alcohol. Alcohol can cause a lack of energy and other health problems for the baby when a breastfeeding woman drinks heavily. It can also get in the way of a mom's ability to feed her baby or to care for the child in other ways. There isn't a lot of research about exactly how much alcohol can harm a baby. Having no alcohol is the safest choice for your baby. If you choose to have a drink now and then, have only one drink, and limit the number of occasions that you have a drink. Wait to breastfeed at least 2 hours after you have a drink to reduce the amount of alcohol the baby may get in the  milk.   Medicines    Your doctor will tell you if and when you can restart your medicines. You will also get instructions about taking any new medicines.     If you stopped taking aspirin or some other blood thinner, your doctor will tell you when to start taking it again.     Take pain medicines exactly as directed.  If the doctor gave you a prescription medicine for pain, take it as prescribed.  If you are not taking a prescription pain medicine, ask your doctor if you can take an over-the-counter medicine.     If you think your pain medicine is making you sick to your stomach:  Take your medicine after meals (unless your doctor has told you not to).  Ask your doctor for a different pain medicine.     If your doctor prescribed antibiotics, take them as directed. Do not stop taking them just because you feel better. You need to take the full course of antibiotics.   Incision care    If you have strips of tape on the incision, leave the tape on for a week or until it falls off.     Wash the area daily with warm, soapy water, and pat it dry. Don't use hydrogen peroxide or alcohol, which can slow healing. You may cover the area with a gauze bandage if it weeps or rubs against clothing. Change the bandage every day.     Keep the area clean and dry.   Other instructions    If you breastfeed your baby, you may be more comfortable while you are healing if you don't rest your baby on your belly. Try tucking your baby under your arm, with your baby's body along the side you will be feeding on. Support your baby's upper body with your arm. With that hand you can control your baby's head to bring your baby's mouth to your breast. This is sometimes called the football hold.   Follow-up care is a key part of your treatment and safety. Be sure to make and go to all appointments, and call your doctor if you are having problems. It's also a good idea to know your test results and keep a list of the medicines you take.  When should  you call for help?  Share this information with your partner, family, or a friend. They can help you watch for warning signs.  Call 911  anytime you think you may need emergency care. For example, call if:    You feel you cannot stop from hurting yourself, your baby, or someone else.     You passed out (lost consciousness).     You have chest pain, are short of breath, or cough up blood.     You have a seizure.   Where to get help 24 hours a day, 7 days a week   If you or someone you know talks about suicide, self-harm, a mental health crisis, a substance use crisis, or any other kind of emotional distress, get help right away. You can:    Call the Suicide and Crisis Lifeline at 988.     Call 6-014-905-TALK (1-577.819.3651).     Text HOME to 443377 to access the Crisis Text Line.   Consider saving these numbers in your phone.  Go to [employer] for more information or to chat online.  Call your doctor or midwife now or seek immediate medical care if:    You have loose stitches, or your incision comes open.     You have signs of hemorrhage (too much bleeding), such as:  Heavy vaginal bleeding. This means that you are soaking through one or more pads in an hour. Or you pass blood clots bigger than an egg.  Feeling dizzy or lightheaded, or you feel like you may faint.  Feeling so tired or weak that you cannot do your usual activities.  A fast or irregular heartbeat.  New or worse belly pain.     You have symptoms of infection, such as:  Increased pain, swelling, warmth, or redness.  Red streaks leading from the incision.  Pus draining from the incision.  A fever.  Frequent or painful urination or blood in your urine.  Vaginal discharge that smells bad.  New or worse belly pain.     You have symptoms of a blood clot in your leg (called a deep vein thrombosis), such as:  Pain in the calf, back of the knee, thigh, or groin.  Swelling in the leg or groin.  A color change on the leg or groin. The skin may be reddish or  "purplish, depending on your usual skin color.     You have signs of preeclampsia, such as:  Sudden swelling of your face, hands, or feet.  New vision problems (such as dimness, blurring, or seeing spots).  A severe headache.     You have signs of heart failure, such as:  New or increased shortness of breath.  New or worse swelling in your legs, ankles, or feet.  Sudden weight gain, such as more than 2 to 3 pounds in a day or 5 pounds in a week.  Feeling so tired or weak that you cannot do your usual activities.     You had spinal or epidural pain relief and have:  New or worse back pain.  Increased pain, swelling, warmth, or redness at the injection site.  Tingling, weakness, or numbness in your legs or groin.   Watch closely for changes in your health, and be sure to contact your doctor or midwife if:    Your vaginal bleeding isn't decreasing.     You feel sad, anxious, or hopeless for more than a few days.     You are having problems with your breasts or breastfeeding.   Where can you learn more?  Go to https://www.[employer].net/patiented  Enter M806 in the search box to learn more about \" Section: What to Expect at Home.\"  Current as of: July 10, 2023               Content Version: 14.0    6261-8374 [employer].   Care instructions adapted under license by your healthcare professional. If you have questions about a medical condition or this instruction, always ask your healthcare professional. [employer] disclaims any warranty or liability for your use of this information.      "

## 2024-08-14 ENCOUNTER — TELEPHONE (OUTPATIENT)
Dept: OBGYN | Facility: CLINIC | Age: 34
End: 2024-08-14
Payer: COMMERCIAL

## 2024-08-14 NOTE — TELEPHONE ENCOUNTER
OB Follow Up Phone Call    :  N/A    Language:  English    Discharge Follow-Up:  Follow-Up call by Outreach nurse: Call placed    Type of Delivery:  C/s    Feeding Method:  Bottle feeding 1-2oz every 3-4 hours    Feedings in 24Hrs:  8-10    Breast engorgement:   Yes    Nipple tenderness:  Yes    Non-nursing engorgement discussed:  Yes    Amount of Feedin-2oz    Number of Infant Stools in 24 hours:  3-5    Stool:  transitional    Number of Infant voids in 24 hours:  5-6    Urine:  yellow    Infant Jaundice:  no    Discussed increasing s/s of Jaundice:  Yes

## 2024-08-30 ENCOUNTER — TRANSCRIBE ORDERS (OUTPATIENT)
Dept: OTHER | Age: 34
End: 2024-08-30

## 2024-08-30 DIAGNOSIS — R32 FEMALE INCONTINENCE: Primary | ICD-10-CM

## 2024-09-18 NOTE — TELEPHONE ENCOUNTER
MEDICAL RECORDS REQUEST   [facility]  U[facility] Clinic  [address] [address]  PHONE: [phone]  Fax: [phone]        FUTURE VISIT INFORMATION                                                   Arnulfo Coker, : 1990 scheduled for future visit at [facility]    APPOINTMENT INFORMATION:  Date: 2024  Provider:  Sarita Luz MD  Reason for Visit/Diagnosis: Female incontinence    REFERRAL INFORMATION:  Referring provider:  Sofiya Nuñez MD @ [facility], [place]    RECORDS REQUESTED FOR VISIT                                                     NOTES  STATUS/DETAILS   OFFICE NOTE from referring provider  Yes  [facility]:     Labs  Yes:  [facility]  10/1/24 - CBC  24 - CMP  24 - Routine UA   MEDICATION LIST  yes     PRE-VISIT CHECKLIST      Joint diagnostic appointment coordinated correctly          (ensure right order & amount of time) Yes   RECORD COLLECTION COMPLETE Yes     Records Requested    Facility  [facility] [place]  Fax: [phone]   Outcome * 10/10/24 2:11 PM Faxed urgent request to [facility] For records to be faxed to the clinic. - Dolores    * 10/11/24 1:32 PM Some records received from [facility] and sent to [facility] to be scanned into the chart. Faxed urgent request to [facility] for additional records. - Dolores

## 2024-09-30 ENCOUNTER — TRANSFERRED RECORDS (OUTPATIENT)
Dept: HEALTH INFORMATION MANAGEMENT | Facility: CLINIC | Age: 34
End: 2024-09-30
Payer: COMMERCIAL

## 2024-11-12 ENCOUNTER — PRE VISIT (OUTPATIENT)
Dept: UROLOGY | Facility: CLINIC | Age: 34
End: 2024-11-12
Payer: COMMERCIAL

## 2024-11-12 NOTE — TELEPHONE ENCOUNTER
Reason for visit: incontinence      Relevant information:  delivery on 24. Appointment made by patient.     Records/imaging/labs/orders: all records available    Pt called: No need for a call    At Rooming: Have patient empty their bladder and PVR. Get a urine sample and dip the urine if they have UTI symptoms.    Therese Scanlon  2024  12:03 PM

## 2024-11-14 ENCOUNTER — PRE VISIT (OUTPATIENT)
Dept: UROLOGY | Facility: CLINIC | Age: 34
End: 2024-11-14

## 2024-11-23 ENCOUNTER — HEALTH MAINTENANCE LETTER (OUTPATIENT)
Age: 34
End: 2024-11-23
